# Patient Record
Sex: MALE | Race: WHITE | NOT HISPANIC OR LATINO | Employment: FULL TIME | URBAN - NONMETROPOLITAN AREA
[De-identification: names, ages, dates, MRNs, and addresses within clinical notes are randomized per-mention and may not be internally consistent; named-entity substitution may affect disease eponyms.]

---

## 2017-05-09 ENCOUNTER — TELEPHONE (OUTPATIENT)
Dept: FAMILY MEDICINE CLINIC | Facility: CLINIC | Age: 57
End: 2017-05-09

## 2017-05-09 DIAGNOSIS — Z12.11 SCREEN FOR COLON CANCER: Primary | ICD-10-CM

## 2017-05-17 ENCOUNTER — OFFICE VISIT (OUTPATIENT)
Dept: FAMILY MEDICINE CLINIC | Facility: CLINIC | Age: 57
End: 2017-05-17

## 2017-05-17 VITALS
BODY MASS INDEX: 29.54 KG/M2 | SYSTOLIC BLOOD PRESSURE: 130 MMHG | WEIGHT: 237.6 LBS | HEIGHT: 75 IN | DIASTOLIC BLOOD PRESSURE: 80 MMHG

## 2017-05-17 DIAGNOSIS — I25.10 CORONARY ARTERY DISEASE INVOLVING NATIVE CORONARY ARTERY OF NATIVE HEART WITHOUT ANGINA PECTORIS: Chronic | ICD-10-CM

## 2017-05-17 DIAGNOSIS — I10 ESSENTIAL HYPERTENSION: Chronic | ICD-10-CM

## 2017-05-17 DIAGNOSIS — G89.29 CHRONIC NECK PAIN: ICD-10-CM

## 2017-05-17 DIAGNOSIS — M54.2 CHRONIC NECK PAIN: ICD-10-CM

## 2017-05-17 DIAGNOSIS — F41.9 ANXIETY: Primary | Chronic | ICD-10-CM

## 2017-05-17 PROCEDURE — 99214 OFFICE O/P EST MOD 30 MIN: CPT | Performed by: FAMILY MEDICINE

## 2017-05-17 RX ORDER — TRAMADOL HYDROCHLORIDE 50 MG/1
50 TABLET ORAL EVERY 6 HOURS PRN
Qty: 20 TABLET | Refills: 2 | Status: SHIPPED | OUTPATIENT
Start: 2017-05-17 | End: 2017-11-15 | Stop reason: SDUPTHER

## 2017-05-18 ENCOUNTER — ANESTHESIA (OUTPATIENT)
Dept: GASTROENTEROLOGY | Facility: HOSPITAL | Age: 57
End: 2017-05-18

## 2017-05-18 ENCOUNTER — HOSPITAL ENCOUNTER (OUTPATIENT)
Facility: HOSPITAL | Age: 57
Setting detail: HOSPITAL OUTPATIENT SURGERY
Discharge: HOME | End: 2017-05-18
Attending: INTERNAL MEDICINE | Admitting: INTERNAL MEDICINE

## 2017-05-18 ENCOUNTER — ANESTHESIA EVENT (OUTPATIENT)
Dept: GASTROENTEROLOGY | Facility: HOSPITAL | Age: 57
End: 2017-05-18

## 2017-05-18 VITALS
SYSTOLIC BLOOD PRESSURE: 110 MMHG | OXYGEN SATURATION: 98 % | HEIGHT: 75 IN | TEMPERATURE: 97.5 F | WEIGHT: 232.5 LBS | HEART RATE: 56 BPM | BODY MASS INDEX: 28.91 KG/M2 | RESPIRATION RATE: 16 BRPM | DIASTOLIC BLOOD PRESSURE: 63 MMHG

## 2017-05-18 DIAGNOSIS — Z12.11 COLON CANCER SCREENING: ICD-10-CM

## 2017-05-18 PROCEDURE — 88305 TISSUE EXAM BY PATHOLOGIST: CPT | Performed by: PATHOLOGY

## 2017-05-18 PROCEDURE — 25010000002 MIDAZOLAM PER 1 MG: Performed by: NURSE ANESTHETIST, CERTIFIED REGISTERED

## 2017-05-18 PROCEDURE — 25010000002 PROPOFOL 10 MG/ML EMULSION: Performed by: NURSE ANESTHETIST, CERTIFIED REGISTERED

## 2017-05-18 PROCEDURE — 25010000002 FENTANYL CITRATE (PF) 100 MCG/2ML SOLUTION: Performed by: NURSE ANESTHETIST, CERTIFIED REGISTERED

## 2017-05-18 PROCEDURE — 88305 TISSUE EXAM BY PATHOLOGIST: CPT | Performed by: INTERNAL MEDICINE

## 2017-05-18 RX ORDER — DEXTROSE AND SODIUM CHLORIDE 5; .45 G/100ML; G/100ML
20 INJECTION, SOLUTION INTRAVENOUS CONTINUOUS
Status: DISCONTINUED | OUTPATIENT
Start: 2017-05-18 | End: 2017-05-18 | Stop reason: HOSPADM

## 2017-05-18 RX ORDER — MIDAZOLAM HYDROCHLORIDE 1 MG/ML
INJECTION INTRAMUSCULAR; INTRAVENOUS AS NEEDED
Status: DISCONTINUED | OUTPATIENT
Start: 2017-05-18 | End: 2017-05-18 | Stop reason: SURG

## 2017-05-18 RX ORDER — PROPOFOL 10 MG/ML
VIAL (ML) INTRAVENOUS AS NEEDED
Status: DISCONTINUED | OUTPATIENT
Start: 2017-05-18 | End: 2017-05-18 | Stop reason: SURG

## 2017-05-18 RX ORDER — FENTANYL CITRATE 50 UG/ML
INJECTION, SOLUTION INTRAMUSCULAR; INTRAVENOUS AS NEEDED
Status: DISCONTINUED | OUTPATIENT
Start: 2017-05-18 | End: 2017-05-18 | Stop reason: SURG

## 2017-05-18 RX ADMIN — PROPOFOL 100 MG: 10 INJECTION, EMULSION INTRAVENOUS at 09:15

## 2017-05-18 RX ADMIN — FENTANYL CITRATE 50 MCG: 50 INJECTION, SOLUTION INTRAMUSCULAR; INTRAVENOUS at 09:10

## 2017-05-18 RX ADMIN — DEXTROSE AND SODIUM CHLORIDE 20 ML/HR: 5; 450 INJECTION, SOLUTION INTRAVENOUS at 08:39

## 2017-05-18 RX ADMIN — FENTANYL CITRATE 50 MCG: 50 INJECTION, SOLUTION INTRAMUSCULAR; INTRAVENOUS at 09:12

## 2017-05-18 RX ADMIN — PROPOFOL 40 MG: 10 INJECTION, EMULSION INTRAVENOUS at 09:20

## 2017-05-18 RX ADMIN — MIDAZOLAM 2 MG: 1 INJECTION INTRAMUSCULAR; INTRAVENOUS at 09:08

## 2017-05-19 LAB
LAB AP CASE REPORT: NORMAL
Lab: NORMAL
PATH REPORT.FINAL DX SPEC: NORMAL
PATH REPORT.GROSS SPEC: NORMAL

## 2017-06-07 ENCOUNTER — OFFICE VISIT (OUTPATIENT)
Dept: CARDIOLOGY | Facility: CLINIC | Age: 57
End: 2017-06-07

## 2017-06-07 VITALS
SYSTOLIC BLOOD PRESSURE: 136 MMHG | DIASTOLIC BLOOD PRESSURE: 84 MMHG | WEIGHT: 233 LBS | BODY MASS INDEX: 28.97 KG/M2 | HEIGHT: 75 IN | HEART RATE: 64 BPM

## 2017-06-07 DIAGNOSIS — M54.2 CHRONIC NECK PAIN: ICD-10-CM

## 2017-06-07 DIAGNOSIS — I10 ESSENTIAL HYPERTENSION: Chronic | ICD-10-CM

## 2017-06-07 DIAGNOSIS — G89.29 CHRONIC NECK PAIN: ICD-10-CM

## 2017-06-07 DIAGNOSIS — I48.0 PAROXYSMAL ATRIAL FIBRILLATION (HCC): Primary | ICD-10-CM

## 2017-06-07 DIAGNOSIS — E78.5 HYPERLIPIDEMIA, UNSPECIFIED HYPERLIPIDEMIA TYPE: Chronic | ICD-10-CM

## 2017-06-07 DIAGNOSIS — I25.10 CORONARY ARTERY DISEASE INVOLVING NATIVE CORONARY ARTERY OF NATIVE HEART WITHOUT ANGINA PECTORIS: Chronic | ICD-10-CM

## 2017-06-07 PROCEDURE — 99213 OFFICE O/P EST LOW 20 MIN: CPT | Performed by: INTERNAL MEDICINE

## 2017-06-07 RX ORDER — PRASUGREL 10 MG/1
10 TABLET, FILM COATED ORAL DAILY
Qty: 90 TABLET | Refills: 2 | Status: SHIPPED | OUTPATIENT
Start: 2017-06-07 | End: 2018-04-01 | Stop reason: SDUPTHER

## 2017-06-07 RX ORDER — ATORVASTATIN CALCIUM 20 MG/1
20 TABLET, FILM COATED ORAL DAILY
Qty: 90 TABLET | Refills: 2 | Status: SHIPPED | OUTPATIENT
Start: 2017-06-07 | End: 2018-05-15

## 2017-06-07 NOTE — PROGRESS NOTES
Shayla Kebede  56 y.o. male    06/07/2017  1. Paroxysmal atrial fibrillation    2. Coronary artery disease involving native coronary artery of native heart without angina pectoris    3. Hyperlipidemia, unspecified hyperlipidemia type    4. Essential hypertension    5. Chronic neck pain        History of Present Illness: Presented for routine follow-up    56 years old patient with past medical history significant for hyperlipidemia, paroxysmal atrial fibrillation to last echocardiographic study 2008, abnormal stress test leading to cardiac catheterization with PTCA stent of LAD and left circumflex system.  Patient is on appropriate medical management and during remarkably well.  He scheduled to the blood test done that included lipid profile through is a family doctor's office.  He denies orthopnea, PND, nauseous, vomiting.  Denies any polydipsia polyuria.  Denies any fever cough or chills.  Present dysuria hematuria.  Had history of chronic neck pain.            SUBJECTIVE    No Known Allergies      Past Medical History:   Diagnosis Date   • Acquired equinus deformity of foot    • Anxiety    • Anxiety state    • Coronary arteriosclerosis    • Foreign body in conjunctival sac     OD   • Ganglion cyst     Ganglion/synovial cyst - hand      • Hyperlipidemia    • Insomnia    • Knee pain    • Neck pain     right upper extremity radiculopathy   • Pain     plantar heel pain   • Pain In Right Arm    • Plantar fasciitis    • Presbyopia          Past Surgical History:   Procedure Laterality Date   • CARDIAC CATHETERIZATION  03/04/2015    Cardiac cath 62243 (2)    Critical lesion in the circumflex coronary artery, proximal/mid, and successful PCI w/balloon angioplasty.Noncritical disease in the RCA and LAD.Normal LV systolic function with Ef of 55% to 60%.    • COLONOSCOPY N/A 5/18/2017    Procedure: COLONOSCOPY;  Surgeon: Curly Mann DO;  Location: [facility] ENDOSCOPY;  Service:    • INJECTION OF MEDICATION   "10/10/2014    Dexamethasone (1)      • INJECTION OF MEDICATION  10/10/2014    Kenalog (1)    • KNEE ACL RECONSTRUCTION     • KNEE ARTHROSCOPY      Knee arthroscopy, surgery (1)      • OTHER SURGICAL HISTORY  10/10/2014    Inj(s) Tend-Sheath, Ligament, Single 20550 (1)     • TOTAL KNEE ARTHROPLASTY      Total knee replacement (1)    done in the late to mid 90's          Family History   Problem Relation Age of Onset   • Aneurysm Mother    • Heart attack Father          Social History     Social History   • Marital status:      Spouse name: N/A   • Number of children: N/A   • Years of education: N/A     Occupational History   • Not on file.     Social History Main Topics   • Smoking status: Former Smoker   • Smokeless tobacco: Never Used   • Alcohol use Yes      Comment: social   • Drug use: No   • Sexual activity: Defer     Other Topics Concern   • Not on file     Social History Narrative         Current Outpatient Prescriptions   Medication Sig Dispense Refill   • aspirin 81 MG chewable tablet Chew 81 mg Daily.     • atorvastatin (LIPITOR) 20 MG tablet TAKE ONE (1) TABLET BY MOUTH EVERY DAY AT BEDTIME 30 tablet 5   • clonazePAM (KlonoPIN) 0.5 MG tablet Take 0.5 mg by mouth As Needed.     • EFFIENT 10 MG tablet TAKE ONE (1) TABLET BY MOUTH EVERY DAY 30 tablet 12   • sildenafil (VIAGRA) 100 MG tablet Take 1 tablet by mouth As Needed for erectile dysfunction. 0.5 to 1 as needed 1 hour before intercoarse 10 tablet 11   • traMADol (ULTRAM) 50 MG tablet Take 1 tablet by mouth Every 6 (Six) Hours As Needed for Moderate Pain (4-6). 20 tablet 2     No current facility-administered medications for this visit.          OBJECTIVE    /84  Pulse 64  Ht 75\" (190.5 cm)  Wt 233 lb (106 kg)  BMI 29.12 kg/m2        Review of Systems     Constitutional:  Denies recent weight loss, weight gain, fever or chills, no change in exercise tolerance     HENT:  Denies any hearing loss, epistaxis, hoarseness, or difficulty " speaking.     Eyes: Wears eyeglasses or contact lenses     Respiratory:  Denies dyspnea with exertion,no cough, wheezing, or hemoptysis.     Cardiovascular: See H&P    Gastrointestinal:  Denies change in bowel habits, dyspepsia, ulcer disease, hematochezia, or melena.     Endocrine: Negative for cold intolerance, heat intolerance, polydipsia, polyphagia and polyuria. Denies any history of weight change, heat/cold intolerance, polydipsia, polyuria     Genitourinary: Negative.      Musculoskeletal:  history of arthritic symptoms or back problems     Skin:  Denies any change in hair or nails, rashes, or skin lesions.     Allergic/Immunologic: Negative.  Negative for environmental allergies, food allergies and immunocompromised state.     Neurological:  Denies any history of recurrent headaches, strokes, TIA, or seizure disorder.     Hematological: Denies any food allergies, seasonal allergies, bleeding disorders, or lymphadenopathy.     Psychiatric/Behavioral: Denies any history of depression, substance abuse, or change in cognitive function.         Physical Exam     Constitutional: Cooperative, alert and oriented, well-developed, well-nourished, in no acute distress.     HENT:   Head: Normocephalic, normal hair patterns, no masses or tenderness.  Ears, Nose, and Throat: No gross abnormalities. No pallor or cyanosis. Dentition good.   Eyes: EOMS intact, PERRL, conjunctivae and lids unremarkable. Fundoscopic exam and visual fields not performed.   Neck: No palpable masses or adenopathy, no thyromegaly, no JVD, carotid pulses are full and equal bilaterally and without  Bruits.     Cardiovascular: Regular rhythm, S1 and S2 normal, no S3 or S4. Apical impulse not displaced. No murmurs, gallops, or rubs detected.     Pulmonary/Chest: Chest: normal symmetry, no tenderness to palpation, normal respiratory excursion, no intercostal retraction, no use of accessory muscles.            Pulmonary: Normal breath sounds. No rales  or ronchi.    Abdominal: Abdomen soft, bowel sounds normoactive, no masses, no hepatosplenomegaly, non-tender, no bruits.     Musculoskeletal: No deformities, clubbing, cyanosis, erythema, or edema observed. There are no spinal abnormalities noted. Normal muscle strength and tone. Pulses full and equal in all extremities, no bruits auscultated.     Neurological: No gross motor or sensory deficits noted, affect appropriate, oriented to time, person, place.     Skin: Warm and dry to the touch, no apparent skin lesions or masses noted.     Psychiatric: He has a normal mood and affect. His behavior is normal. Judgment and thought content normal.         Procedures      Lab Results   Component Value Date    WBC 7.5 07/10/2015    HGB 15.1 07/10/2015    HCT 45.1 07/10/2015    MCV 88.4 07/10/2015     07/10/2015     Lab Results   Component Value Date    GLUCOSE 96 11/08/2016    BUN 14 11/08/2016    CREATININE 1.0 11/08/2016    CO2 30 11/08/2016    CALCIUM 9.1 11/08/2016    ALBUMIN 4.3 11/08/2016    AST 95 (H) 11/08/2016    ALT 45 11/08/2016     No results found for: CHOL  Lab Results   Component Value Date    TRIG 150 07/10/2015    TRIG 118 04/13/2015    TRIG 262 (H) 05/05/2014     Lab Results   Component Value Date    HDL 53 (L) 07/10/2015    HDL 53 (L) 04/13/2015     Lab Results   Component Value Date    LDLCALC 109 07/10/2015    LDLCALC 131 (H) 04/13/2015     No results found for: LDL  No results found for: HDLLDLRATIO  No components found for: CHOLHDL  No results found for: HGBA1C  Lab Results   Component Value Date    TSH 2.51 07/10/2015           ASSESSMENT AND PLAN  #1 CAD status post PTCA/ stent.  #2 paroxysmal atrial fibrillation no further recurrence.  #3 hyperlipidemia    56 years old patient with history of CAD status post PTCA stent of LAD and left circumflex system doing remarkably well.  Last echocardiographic study in 2008.  Will arrange another echocardiographic study given the history of paroxysmal  atrial fibrillation and CAD.  He will continue atorvastatin for management of hyperlipidemia with attending lipid profile, aspirin and Effient.  History of CAD and PTCA stent of LAD and left circumflex system.  We will see him back in 6 month R depends on patient clinical conditions.    Diagnoses and all orders for this visit:    Paroxysmal atrial fibrillation  -     Adult Transthoracic Echo Complete    Coronary artery disease involving native coronary artery of native heart without angina pectoris    Hyperlipidemia, unspecified hyperlipidemia type    Essential hypertension    Chronic neck pain        Amber Dee MD  6/7/2017  3:25 PM

## 2017-10-31 ENCOUNTER — LAB (OUTPATIENT)
Dept: LAB | Facility: HOSPITAL | Age: 57
End: 2017-10-31

## 2017-10-31 DIAGNOSIS — I25.10 CORONARY ARTERY DISEASE INVOLVING NATIVE CORONARY ARTERY OF NATIVE HEART WITHOUT ANGINA PECTORIS: Chronic | ICD-10-CM

## 2017-10-31 LAB
ALBUMIN SERPL-MCNC: 4.3 G/DL (ref 3.4–4.8)
ALBUMIN/GLOB SERPL: 1.5 G/DL (ref 1.1–1.8)
ALP SERPL-CCNC: 56 U/L (ref 38–126)
ALT SERPL W P-5'-P-CCNC: 40 U/L (ref 21–72)
ANION GAP SERPL CALCULATED.3IONS-SCNC: 14 MMOL/L (ref 5–15)
ARTICHOKE IGE QN: 95 MG/DL (ref 1–129)
AST SERPL-CCNC: 29 U/L (ref 17–59)
BILIRUB SERPL-MCNC: 0.5 MG/DL (ref 0.2–1.3)
BUN BLD-MCNC: 12 MG/DL (ref 7–21)
BUN/CREAT SERPL: 11 (ref 7–25)
CALCIUM SPEC-SCNC: 9.1 MG/DL (ref 8.4–10.2)
CHLORIDE SERPL-SCNC: 100 MMOL/L (ref 95–110)
CO2 SERPL-SCNC: 26 MMOL/L (ref 22–31)
CREAT BLD-MCNC: 1.09 MG/DL (ref 0.7–1.3)
GFR SERPL CREATININE-BSD FRML MDRD: 69 ML/MIN/1.73 (ref 56–130)
GLOBULIN UR ELPH-MCNC: 2.8 GM/DL (ref 2.3–3.5)
GLUCOSE BLD-MCNC: 126 MG/DL (ref 60–100)
MAGNESIUM SERPL-MCNC: 2.1 MG/DL (ref 1.6–2.3)
POTASSIUM BLD-SCNC: 3.7 MMOL/L (ref 3.5–5.1)
PROT SERPL-MCNC: 7.1 G/DL (ref 6.3–8.6)
SODIUM BLD-SCNC: 140 MMOL/L (ref 137–145)

## 2017-10-31 PROCEDURE — 83735 ASSAY OF MAGNESIUM: CPT | Performed by: FAMILY MEDICINE

## 2017-10-31 PROCEDURE — 80053 COMPREHEN METABOLIC PANEL: CPT | Performed by: FAMILY MEDICINE

## 2017-10-31 PROCEDURE — 36415 COLL VENOUS BLD VENIPUNCTURE: CPT

## 2017-10-31 PROCEDURE — 83721 ASSAY OF BLOOD LIPOPROTEIN: CPT | Performed by: FAMILY MEDICINE

## 2017-11-01 ENCOUNTER — OFFICE VISIT (OUTPATIENT)
Dept: FAMILY MEDICINE CLINIC | Facility: CLINIC | Age: 57
End: 2017-11-01

## 2017-11-01 VITALS
WEIGHT: 227.7 LBS | DIASTOLIC BLOOD PRESSURE: 80 MMHG | HEIGHT: 75 IN | SYSTOLIC BLOOD PRESSURE: 126 MMHG | BODY MASS INDEX: 28.31 KG/M2

## 2017-11-01 DIAGNOSIS — I10 ESSENTIAL HYPERTENSION: Primary | Chronic | ICD-10-CM

## 2017-11-01 DIAGNOSIS — M54.50 ACUTE LEFT-SIDED LOW BACK PAIN WITHOUT SCIATICA: ICD-10-CM

## 2017-11-01 DIAGNOSIS — E78.5 HYPERLIPIDEMIA, UNSPECIFIED HYPERLIPIDEMIA TYPE: Chronic | ICD-10-CM

## 2017-11-01 DIAGNOSIS — I25.10 CORONARY ARTERY DISEASE INVOLVING NATIVE CORONARY ARTERY OF NATIVE HEART WITHOUT ANGINA PECTORIS: Chronic | ICD-10-CM

## 2017-11-01 PROCEDURE — 99214 OFFICE O/P EST MOD 30 MIN: CPT | Performed by: FAMILY MEDICINE

## 2017-11-01 RX ORDER — CHLORZOXAZONE 500 MG/1
500 TABLET ORAL 4 TIMES DAILY PRN
Qty: 30 TABLET | Refills: 1 | Status: SHIPPED | OUTPATIENT
Start: 2017-11-01 | End: 2017-11-02

## 2017-11-01 NOTE — PROGRESS NOTES
Subjective   Shayla Kebede is a 57 y.o. male.     History of Present Illness   follow-up coronary disease hypertension mild obesity.  In interim is tweaked his back from rotational injury of counseled treatment of same.  Has lost some weight exercising more lab work is excellent.  Declines flu vaccine.  Has had a colonoscopy.  Overall stable.    The following portions of the patient's history were reviewed and updated as appropriate: allergies, current medications, past family history, past medical history, past social history, past surgical history and problem list.    Review of Systems   Constitutional: Negative for activity change, appetite change, fatigue and unexpected weight change.   HENT: Negative for trouble swallowing and voice change.    Eyes: Negative for redness and visual disturbance.   Respiratory: Negative for cough and wheezing.    Cardiovascular: Negative for chest pain and palpitations.   Gastrointestinal: Negative for abdominal pain, constipation, diarrhea, nausea and vomiting.   Genitourinary: Negative for urgency.   Musculoskeletal: Positive for back pain. Negative for joint swelling.   Neurological: Negative for syncope and headaches.   Hematological: Negative for adenopathy.   Psychiatric/Behavioral: Negative for sleep disturbance.       Objective   Physical Exam   Constitutional: He is oriented to person, place, and time. He appears well-developed.   HENT:   Head: Normocephalic.   Right Ear: External ear normal.   Nose: Nose normal.   Mouth/Throat: Oropharynx is clear and moist.   Eyes: Pupils are equal, round, and reactive to light.   Neck: Normal range of motion. No thyromegaly present.   Cardiovascular: Normal rate, regular rhythm, normal heart sounds and intact distal pulses.  Exam reveals no gallop and no friction rub.    No murmur heard.  Pulmonary/Chest: Breath sounds normal.   Abdominal: Soft. He exhibits no distension and no mass. There is no tenderness.   Musculoskeletal:  Normal range of motion. He exhibits tenderness (Point tender right low back pain to full range of motion negative straight leg raise).   Neurological: He is alert and oriented to person, place, and time. He has normal reflexes.   Skin: Skin is warm and dry.   Psychiatric: He has a normal mood and affect. His behavior is normal. Judgment and thought content normal.       Assessment/Plan   Problems Addressed this Visit        Cardiovascular and Mediastinum    Essential hypertension - Primary (Chronic)    Hyperlipidemia (Chronic)    Coronary artery disease involving native coronary artery of native heart without angina pectoris (Chronic)      Other Visit Diagnoses     Acute left-sided low back pain without sciatica             lifestyle measures diet exercise continue weight loss back care etc.  Recheck 6 months.

## 2017-11-02 RX ORDER — TIZANIDINE 2 MG/1
TABLET ORAL
Qty: 20 TABLET | Refills: 1 | Status: SHIPPED | OUTPATIENT
Start: 2017-11-02 | End: 2018-05-15

## 2017-11-03 ENCOUNTER — TELEPHONE (OUTPATIENT)
Dept: FAMILY MEDICINE CLINIC | Facility: CLINIC | Age: 57
End: 2017-11-03

## 2017-11-08 ENCOUNTER — TELEPHONE (OUTPATIENT)
Dept: FAMILY MEDICINE CLINIC | Facility: CLINIC | Age: 57
End: 2017-11-08

## 2017-11-13 ENCOUNTER — TELEPHONE (OUTPATIENT)
Dept: FAMILY MEDICINE CLINIC | Facility: CLINIC | Age: 57
End: 2017-11-13

## 2017-11-13 DIAGNOSIS — M54.42 ACUTE LEFT-SIDED LOW BACK PAIN WITH LEFT-SIDED SCIATICA: Primary | ICD-10-CM

## 2017-11-15 ENCOUNTER — TELEPHONE (OUTPATIENT)
Dept: FAMILY MEDICINE CLINIC | Facility: CLINIC | Age: 57
End: 2017-11-15

## 2017-11-15 DIAGNOSIS — G89.29 CHRONIC NECK PAIN: ICD-10-CM

## 2017-11-15 DIAGNOSIS — M54.2 CHRONIC NECK PAIN: ICD-10-CM

## 2017-11-15 RX ORDER — TRAMADOL HYDROCHLORIDE 50 MG/1
50 TABLET ORAL EVERY 6 HOURS PRN
Qty: 20 TABLET | Refills: 2 | Status: SHIPPED | OUTPATIENT
Start: 2017-11-15 | End: 2018-05-15

## 2017-11-17 DIAGNOSIS — M54.5 LOW BACK PAIN, UNSPECIFIED BACK PAIN LATERALITY, UNSPECIFIED CHRONICITY, WITH SCIATICA PRESENCE UNSPECIFIED: Primary | ICD-10-CM

## 2017-12-05 ENCOUNTER — OFFICE VISIT (OUTPATIENT)
Dept: CARDIOLOGY | Facility: CLINIC | Age: 57
End: 2017-12-05

## 2017-12-05 VITALS
HEIGHT: 75 IN | WEIGHT: 223 LBS | DIASTOLIC BLOOD PRESSURE: 82 MMHG | BODY MASS INDEX: 27.73 KG/M2 | HEART RATE: 62 BPM | SYSTOLIC BLOOD PRESSURE: 130 MMHG

## 2017-12-05 DIAGNOSIS — I25.10 CORONARY ARTERY DISEASE INVOLVING NATIVE CORONARY ARTERY OF NATIVE HEART WITHOUT ANGINA PECTORIS: Chronic | ICD-10-CM

## 2017-12-05 DIAGNOSIS — I48.0 PAROXYSMAL ATRIAL FIBRILLATION (HCC): ICD-10-CM

## 2017-12-05 DIAGNOSIS — I10 ESSENTIAL HYPERTENSION: Primary | Chronic | ICD-10-CM

## 2017-12-05 DIAGNOSIS — E78.5 HYPERLIPIDEMIA, UNSPECIFIED HYPERLIPIDEMIA TYPE: Chronic | ICD-10-CM

## 2017-12-05 LAB
BH CV ECHO MEAS - AO MAX PG (FULL): 2.3 MMHG
BH CV ECHO MEAS - AO MAX PG: 7 MMHG
BH CV ECHO MEAS - AO MEAN PG (FULL): 1 MMHG
BH CV ECHO MEAS - AO MEAN PG: 4 MMHG
BH CV ECHO MEAS - AO V2 MAX: 132 CM/SEC
BH CV ECHO MEAS - AO V2 MEAN: 93.7 CM/SEC
BH CV ECHO MEAS - AO V2 VTI: 30.4 CM
BH CV ECHO MEAS - AVA(I,A): 3.4 CM^2
BH CV ECHO MEAS - AVA(I,D): 3.4 CM^2
BH CV ECHO MEAS - AVA(V,A): 3.4 CM^2
BH CV ECHO MEAS - AVA(V,D): 3.4 CM^2
BH CV ECHO MEAS - BSA(HAYCOCK): 2.4 M^2
BH CV ECHO MEAS - BSA: 2.3 M^2
BH CV ECHO MEAS - BZI_BMI: 28.4 KILOGRAMS/M^2
BH CV ECHO MEAS - BZI_METRIC_HEIGHT: 190.5 CM
BH CV ECHO MEAS - BZI_METRIC_WEIGHT: 103 KG
BH CV ECHO MEAS - EDV(CUBED): 175.6 ML
BH CV ECHO MEAS - EDV(TEICH): 153.7 ML
BH CV ECHO MEAS - EF(CUBED): 63.6 %
BH CV ECHO MEAS - EF(MOD-SP4): 60 %
BH CV ECHO MEAS - EF(TEICH): 54.4 %
BH CV ECHO MEAS - EPSS: 0.5 CM
BH CV ECHO MEAS - ESV(CUBED): 64 ML
BH CV ECHO MEAS - ESV(TEICH): 70 ML
BH CV ECHO MEAS - FS: 28.6 %
BH CV ECHO MEAS - IVS/LVPW: 0.87
BH CV ECHO MEAS - IVSD: 1.3 CM
BH CV ECHO MEAS - LA DIMENSION: 3.4 CM
BH CV ECHO MEAS - LV MASS(C)D: 347.6 GRAMS
BH CV ECHO MEAS - LV MASS(C)DI: 150.1 GRAMS/M^2
BH CV ECHO MEAS - LV MAX PG: 4.7 MMHG
BH CV ECHO MEAS - LV MEAN PG: 3 MMHG
BH CV ECHO MEAS - LV V1 MAX: 108 CM/SEC
BH CV ECHO MEAS - LV V1 MEAN: 73.1 CM/SEC
BH CV ECHO MEAS - LV V1 VTI: 24.7 CM
BH CV ECHO MEAS - LVIDD: 5.6 CM
BH CV ECHO MEAS - LVIDS: 4 CM
BH CV ECHO MEAS - LVOT AREA (M): 4.2 CM^2
BH CV ECHO MEAS - LVOT AREA: 4.2 CM^2
BH CV ECHO MEAS - LVOT DIAM: 2.3 CM
BH CV ECHO MEAS - LVPWD: 1.5 CM
BH CV ECHO MEAS - MR MAX PG: 21.7 MMHG
BH CV ECHO MEAS - MR MAX VEL: 233 CM/SEC
BH CV ECHO MEAS - MV A MAX VEL: 71.6 CM/SEC
BH CV ECHO MEAS - MV E MAX VEL: 64.2 CM/SEC
BH CV ECHO MEAS - MV E/A: 0.9
BH CV ECHO MEAS - PA MAX PG: 4.2 MMHG
BH CV ECHO MEAS - PA MEAN PG: 2 MMHG
BH CV ECHO MEAS - PA V2 MAX: 103 CM/SEC
BH CV ECHO MEAS - PA V2 MEAN: 63.9 CM/SEC
BH CV ECHO MEAS - PA V2 VTI: 19.3 CM
BH CV ECHO MEAS - RAP SYSTOLE: 10 MMHG
BH CV ECHO MEAS - RVDD: 2.1 CM
BH CV ECHO MEAS - RVSP: 25.7 MMHG
BH CV ECHO MEAS - SI(CUBED): 48.2 ML/M^2
BH CV ECHO MEAS - SI(LVOT): 44.3 ML/M^2
BH CV ECHO MEAS - SI(TEICH): 36.1 ML/M^2
BH CV ECHO MEAS - SV(CUBED): 111.6 ML
BH CV ECHO MEAS - SV(LVOT): 102.6 ML
BH CV ECHO MEAS - SV(TEICH): 83.7 ML
BH CV ECHO MEAS - TR MAX VEL: 197.5 CM/SEC
LV EF 2D ECHO EST: 55 %

## 2017-12-05 PROCEDURE — 93000 ELECTROCARDIOGRAM COMPLETE: CPT | Performed by: INTERNAL MEDICINE

## 2017-12-05 PROCEDURE — 99213 OFFICE O/P EST LOW 20 MIN: CPT | Performed by: INTERNAL MEDICINE

## 2017-12-05 NOTE — PROGRESS NOTES
Shayla Kebede  57 y.o. male    12/05/2017  1. Essential hypertension    2. Hyperlipidemia, unspecified hyperlipidemia type    3. Coronary artery disease involving native coronary artery of native heart without angina pectoris    4. Paroxysmal atrial fibrillation        History of Present Illness:Routine follow-up      57 years old patient with past medical history significant for hyperlipidemia, paroxysmal atrial fibrillation to last echocardiographic study 2008, abnormal stress test leading to cardiac catheterization with PTCA stent of LAD and left circumflex system.  Patient is on appropriate medical management and during remarkably well.  He scheduled to the blood test done that included lipid profile through is a family doctor's office.  He denies orthopnea, PND, nauseous, vomiting.  Denies any polydipsia polyuria.  Denies any fever cough or chills.  Present dysuria hematuria.  Had history of chronic neck pain  · Left ventricular systolic function is normal. Estimated EF = 55%.  · Left ventricular diastolic dysfunction (grade I) consistent with impaired relaxation.  · Mild tricuspid valve regurgitation is present    SUBJECTIVE    No Known Allergies      Past Medical History:   Diagnosis Date   • Acquired equinus deformity of foot    • Anxiety    • Anxiety state    • Coronary arteriosclerosis    • Foreign body in conjunctival sac     OD   • Ganglion cyst     Ganglion/synovial cyst - hand      • Hyperlipidemia    • Insomnia    • Knee pain    • Neck pain     right upper extremity radiculopathy   • Pain     plantar heel pain   • Pain in right arm    • Plantar fasciitis    • Presbyopia          Past Surgical History:   Procedure Laterality Date   • CARDIAC CATHETERIZATION  03/04/2015    Cardiac cath 61435 (2)    Critical lesion in the circumflex coronary artery, proximal/mid, and successful PCI w/balloon angioplasty.Noncritical disease in the RCA and LAD.Normal LV systolic function with Ef of 55% to 60%.    •  "COLONOSCOPY N/A 5/18/2017    Procedure: COLONOSCOPY;  Surgeon: Curly Mann DO;  Location: [facility] ENDOSCOPY;  Service:    • INJECTION OF MEDICATION  10/10/2014    Dexamethasone (1)      • INJECTION OF MEDICATION  10/10/2014    Kenalog (1)    • KNEE ACL RECONSTRUCTION     • KNEE ARTHROSCOPY      Knee arthroscopy, surgery (1)      • OTHER SURGICAL HISTORY  10/10/2014    Inj(s) Tend-Sheath, Ligament, Single 98993 (1)     • TOTAL KNEE ARTHROPLASTY      Total knee replacement (1)    done in the late to mid 90's          Family History   Problem Relation Age of Onset   • Aneurysm Mother    • Heart attack Father          Social History     Social History   • Marital status:      Spouse name: N/A   • Number of children: N/A   • Years of education: N/A     Occupational History   • Not on file.     Social History Main Topics   • Smoking status: Former Smoker   • Smokeless tobacco: Never Used   • Alcohol use Yes      Comment: social   • Drug use: No   • Sexual activity: Defer     Other Topics Concern   • Not on file     Social History Narrative         Current Outpatient Prescriptions   Medication Sig Dispense Refill   • aspirin 81 MG chewable tablet Chew 81 mg Daily.     • atorvastatin (LIPITOR) 20 MG tablet Take 1 tablet by mouth Daily. 90 tablet 2   • clonazePAM (KlonoPIN) 0.5 MG tablet Take 0.5 mg by mouth As Needed.     • prasugrel (EFFIENT) 10 MG tablet Take 1 tablet by mouth Daily. 90 tablet 2   • sildenafil (VIAGRA) 100 MG tablet Take 1 tablet by mouth As Needed for erectile dysfunction. 0.5 to 1 as needed 1 hour before intercoarse 10 tablet 11   • tiZANidine (ZANAFLEX) 2 MG tablet 1 bid prn muscle spasms 20 tablet 1   • traMADol (ULTRAM) 50 MG tablet Take 1 tablet by mouth Every 6 (Six) Hours As Needed for Moderate Pain . 20 tablet 2     No current facility-administered medications for this visit.          OBJECTIVE    /82  Pulse 62  Ht 190.5 cm (75\")  Wt 101 kg (223 lb)  BMI 27.87 " kg/m2        Review of Systems     Constitutional:  Denies recent weight loss, weight gain, fever or chills, no change in exercise tolerance     HENT:  Denies any hearing loss, epistaxis, hoarseness, or difficulty speaking.     Eyes: No blurry Respiratory:  Denies dyspnea with exertion,no cough, wheezing, or hemoptysis.     Cardiovascular: See H&P    Gastrointestinal:  Denies change in bowel habits, dyspepsia, ulcer disease, hematochezia, or melena.     Endocrine: Negative for cold intolerance, heat intolerance, polydipsia, polyphagia and polyuria. Denies any history of weight change, heat/cold intolerance, polydipsia, polyuria     Genitourinary: Negative.      Musculoskeletal: Denies any history of arthritic symptoms or back problems     Skin:  Denies any change in hair or nails, rashes, or skin lesions.     Allergic/Immunologic: Negative.  Negative for environmental allergies, food allergies and immunocompromised state.     Neurological:  Denies any history of recurrent headaches, strokes, TIA, or seizure disorder.     Hematological: Denies any food allergies, seasonal allergies, bleeding disorders, or lymphadenopathy.     Psychiatric/Behavioral: Denies any history of depression, substance abuse, or change in cognitive function.         Physical Exam     Constitutional: Cooperative, alert and oriented, well-developed, well-nourished, in no acute distress.     HENT:   Head: Normocephalic, normal hair patterns, no masses or tenderness.  Ears, Nose, and Throat: No gross abnormalities. No pallor or cyanosis. Dentition good.   Eyes: EOMS intact, PERRL, conjunctivae and lids unremarkable. Fundoscopic exam and visual fields not performed.   Neck: No palpable masses or adenopathy, no thyromegaly, no JVD, carotid pulses are full and equal bilaterally and without  Bruits.     Cardiovascular: Regular rhythm, S1 and S2 normal, no S3 or S4. Apical impulse not displaced. No murmurs, gallops, or rubs detected.      Pulmonary/Chest: Chest: normal symmetry, no tenderness to palpation, normal respiratory excursion, no intercostal retraction, no use of accessory muscles.            Pulmonary: Normal breath sounds. No rales or ronchi.    Abdominal: Abdomen soft, bowel sounds normoactive, no masses, no hepatosplenomegaly, non-tender, no bruits.     Musculoskeletal: No deformities, clubbing, cyanosis, erythema, or edema observed. There are no spinal abnormalities noted. Normal muscle strength and tone. Pulses full and equal in all extremities, no bruits auscultated.     Neurological: No gross motor or sensory deficits noted, affect appropriate, oriented to time, person, place.     Skin: Warm and dry to the touch, no apparent skin lesions or masses noted.     Psychiatric: He has a normal mood and affect. His behavior is normal. Judgment and thought content normal.           ECG 12 Lead  Date/Time: 12/5/2017 4:23 PM  Performed by: GENE OLIVARES  Authorized by: GENE OLIVARES   Comparison: not compared with previous ECG   Rhythm: sinus rhythm              Lab Results   Component Value Date    WBC 7.5 07/10/2015    HGB 15.1 07/10/2015    HCT 45.1 07/10/2015    MCV 88.4 07/10/2015     07/10/2015     Lab Results   Component Value Date    GLUCOSE 126 (H) 10/31/2017    BUN 12 10/31/2017    CREATININE 1.09 10/31/2017    EGFRIFNONA 69 10/31/2017    BCR 11.0 10/31/2017    CO2 26.0 10/31/2017    CALCIUM 9.1 10/31/2017    ALBUMIN 4.30 10/31/2017    LABIL2 1.5 10/31/2017    AST 29 10/31/2017    ALT 40 10/31/2017     No results found for: CHOL  Lab Results   Component Value Date    TRIG 150 07/10/2015    TRIG 118 04/13/2015    TRIG 262 (H) 05/05/2014     Lab Results   Component Value Date    HDL 53 (L) 07/10/2015    HDL 53 (L) 04/13/2015     Lab Results   Component Value Date    LDLCALC 109 07/10/2015    LDLCALC 131 (H) 04/13/2015     No results found for: LDL  No results found for: HDLLDLRATIO  No components found for: CHOLHDL  No  results found for: HGBA1C  Lab Results   Component Value Date    TSH 2.51 07/10/2015           ASSESSMENT AND PLAN  #1 CAD status post PTCA/ stent.  #2 paroxysmal atrial fibrillation no further recurrence.  #3 hyperlipidemia with recent  LDL Cholesterol  1 - 129 mg/dL 95          57 years old patient with history of CAD status post PTCA stent of LAD and left circumflex system doing remarkably well.  Last echocardiographic study in 2008.  Will arrange another echocardiographic study given the history of paroxysmal atrial fibrillation and CAD.  He will continue atorvastatin for management of hyperlipidemia with attending lipid profile, aspirin and Effient.  History of CAD and PTCA stent of LAD and left circumflex system.Recent to lipid profile then done to is a family doctor's office.  LDL within the normal range.  Echocardiographic study finding discussed with the patient  We will see him back in 6 month R depends on patient clinical conditions    Shayla was seen today for follow-up.    Diagnoses and all orders for this visit:    Essential hypertension    Hyperlipidemia, unspecified hyperlipidemia type    Coronary artery disease involving native coronary artery of native heart without angina pectoris    Paroxysmal atrial fibrillation        Amber Dee MD  12/5/2017  2:59 PM

## 2018-02-15 ENCOUNTER — OFFICE VISIT (OUTPATIENT)
Dept: FAMILY MEDICINE CLINIC | Facility: CLINIC | Age: 58
End: 2018-02-15

## 2018-02-15 VITALS
WEIGHT: 224 LBS | HEIGHT: 75 IN | DIASTOLIC BLOOD PRESSURE: 82 MMHG | BODY MASS INDEX: 27.85 KG/M2 | SYSTOLIC BLOOD PRESSURE: 128 MMHG

## 2018-02-15 DIAGNOSIS — M25.511 CHRONIC RIGHT SHOULDER PAIN: Primary | ICD-10-CM

## 2018-02-15 DIAGNOSIS — M75.41 IMPINGEMENT SYNDROME, SHOULDER, RIGHT: ICD-10-CM

## 2018-02-15 DIAGNOSIS — G89.29 CHRONIC RIGHT SHOULDER PAIN: Primary | ICD-10-CM

## 2018-02-15 PROCEDURE — 99213 OFFICE O/P EST LOW 20 MIN: CPT | Performed by: FAMILY MEDICINE

## 2018-02-15 NOTE — PROGRESS NOTES
Subjective   Shayla Kebede is a 57 y.o. male.     History of Present Illness  quest evaluation impingement syndrome right shoulder that way about a year.  Been doing home exercises.  States is unable to AB duct past about 110.  No history of direct trauma but just prolonged.  His trauma over time.  History noted.    The following portions of the patient's history were reviewed and updated as appropriate: allergies, current medications, past family history, past medical history, past social history, past surgical history and problem list.    Review of Systems   Constitutional: Negative for activity change, appetite change, fatigue and unexpected weight change.   HENT: Negative for trouble swallowing and voice change.    Eyes: Negative for redness and visual disturbance.   Respiratory: Negative for cough and wheezing.    Cardiovascular: Negative for chest pain and palpitations.   Gastrointestinal: Negative for abdominal pain, constipation, diarrhea, nausea and vomiting.   Genitourinary: Negative for urgency.   Musculoskeletal: Positive for arthralgias. Negative for joint swelling.   Neurological: Negative for syncope and headaches.   Hematological: Negative for adenopathy.   Psychiatric/Behavioral: Negative for sleep disturbance.       Objective   Physical Exam   Constitutional: He appears well-developed.   HENT:   Head: Normocephalic.   Eyes: Pupils are equal, round, and reactive to light.   Neck: Normal range of motion.   Cardiovascular: Normal rate.    Pulmonary/Chest: Effort normal.   Abdominal: Soft.   Musculoskeletal:        Right shoulder: He exhibits decreased range of motion and tenderness.   Point tender lateral.  Before meals joint nontender.  Abduction tenderness to 90 decreased active 110 passive to about 120 to 1:30.   Neurological:    normal negative pronator   Psychiatric: He has a normal mood and affect. His speech is normal.       Assessment/Plan   Shayla was seen today for shoulder  pain.    Diagnoses and all orders for this visit:    Chronic right shoulder pain  -     Ambulatory Referral to Orthopedic Surgery    Impingement syndrome, shoulder, right  -     Ambulatory Referral to Orthopedic Surgery      states it works in out of town but not be able to do full physical therapy.   need for possible intervention by orthopedics given the fact of time and fell of exercise.  Made arrangements for same.

## 2018-02-23 DIAGNOSIS — M25.511 RIGHT SHOULDER PAIN, UNSPECIFIED CHRONICITY: Primary | ICD-10-CM

## 2018-03-01 ENCOUNTER — OFFICE VISIT (OUTPATIENT)
Dept: ORTHOPEDIC SURGERY | Facility: CLINIC | Age: 58
End: 2018-03-01

## 2018-03-01 VITALS — WEIGHT: 224.87 LBS | HEIGHT: 75 IN | BODY MASS INDEX: 27.96 KG/M2

## 2018-03-01 DIAGNOSIS — M75.01 ADHESIVE CAPSULITIS OF RIGHT SHOULDER: ICD-10-CM

## 2018-03-01 DIAGNOSIS — M25.511 RIGHT SHOULDER PAIN, UNSPECIFIED CHRONICITY: Primary | ICD-10-CM

## 2018-03-01 PROCEDURE — 20610 DRAIN/INJ JOINT/BURSA W/O US: CPT | Performed by: ORTHOPAEDIC SURGERY

## 2018-03-01 PROCEDURE — 99203 OFFICE O/P NEW LOW 30 MIN: CPT | Performed by: ORTHOPAEDIC SURGERY

## 2018-03-01 RX ORDER — TRIAMCINOLONE ACETONIDE 40 MG/ML
80 INJECTION, SUSPENSION INTRA-ARTICULAR; INTRAMUSCULAR
Status: COMPLETED | OUTPATIENT
Start: 2018-03-01 | End: 2018-03-01

## 2018-03-01 RX ORDER — LIDOCAINE HYDROCHLORIDE 10 MG/ML
4 INJECTION, SOLUTION EPIDURAL; INFILTRATION; INTRACAUDAL; PERINEURAL
Status: COMPLETED | OUTPATIENT
Start: 2018-03-01 | End: 2018-03-01

## 2018-03-01 RX ADMIN — LIDOCAINE HYDROCHLORIDE 4 ML: 10 INJECTION, SOLUTION EPIDURAL; INFILTRATION; INTRACAUDAL; PERINEURAL at 09:21

## 2018-03-01 RX ADMIN — TRIAMCINOLONE ACETONIDE 80 MG: 40 INJECTION, SUSPENSION INTRA-ARTICULAR; INTRAMUSCULAR at 09:21

## 2018-03-01 NOTE — PROGRESS NOTES
Shayla Kebede is a 57 y.o. male returns for     Chief Complaint   Patient presents with   • Right Shoulder - Pain       HISTORY OF PRESENT ILLNESS: Patient is here today for right shoulder pain. Patient was sent to [facility] upon arrival. Patient states that his pain is 1-2 unless he moves his arm quickly or has to do much movement out of the ordinary. When he moves it more doing more motion he states that the pain is severe. This is been going on for several months with no particular injury although he has had a lot of activity doing a lot of painting around his house.  He is right-hand-dominant and has noted radiation without numbness or weakness.  He is unable to take anti-inflammatories because of his cardiac condition.       CONCURRENT MEDICAL HISTORY:    Past Medical History:   Diagnosis Date   • Acquired equinus deformity of foot    • Anxiety    • Anxiety state    • Coronary arteriosclerosis    • Foreign body in conjunctival sac     OD   • Ganglion cyst     Ganglion/synovial cyst - hand      • Hyperlipidemia    • Insomnia    • Knee pain    • Neck pain     right upper extremity radiculopathy   • Pain     plantar heel pain   • Pain in right arm    • Plantar fasciitis    • Presbyopia        No Known Allergies      Current Outpatient Prescriptions:   •  aspirin 81 MG chewable tablet, Chew 81 mg Daily., Disp: , Rfl:   •  atorvastatin (LIPITOR) 20 MG tablet, Take 1 tablet by mouth Daily., Disp: 90 tablet, Rfl: 2  •  clonazePAM (KlonoPIN) 0.5 MG tablet, Take 0.5 mg by mouth As Needed., Disp: , Rfl:   •  prasugrel (EFFIENT) 10 MG tablet, Take 1 tablet by mouth Daily., Disp: 90 tablet, Rfl: 2  •  sildenafil (VIAGRA) 100 MG tablet, Take 1 tablet by mouth As Needed for erectile dysfunction. 0.5 to 1 as needed 1 hour before intercoarse, Disp: 10 tablet, Rfl: 11  •  tiZANidine (ZANAFLEX) 2 MG tablet, 1 bid prn muscle spasms, Disp: 20 tablet, Rfl: 1  •  traMADol (ULTRAM) 50 MG tablet, Take 1 tablet by mouth Every 6  "(Six) Hours As Needed for Moderate Pain ., Disp: 20 tablet, Rfl: 2    Past Surgical History:   Procedure Laterality Date   • CARDIAC CATHETERIZATION  03/04/2015    Cardiac cath 93137 (2)    Critical lesion in the circumflex coronary artery, proximal/mid, and successful PCI w/balloon angioplasty.Noncritical disease in the RCA and LAD.Normal LV systolic function with Ef of 55% to 60%.    • COLONOSCOPY N/A 5/18/2017    Procedure: COLONOSCOPY;  Surgeon: Curly Mann DO;  Location: [facility] ENDOSCOPY;  Service:    • INJECTION OF MEDICATION  10/10/2014    Dexamethasone (1)      • INJECTION OF MEDICATION  10/10/2014    Kenalog (1)    • KNEE ACL RECONSTRUCTION     • KNEE ARTHROSCOPY      Knee arthroscopy, surgery (1)      • OTHER SURGICAL HISTORY  10/10/2014    Inj(s) Tend-Sheath, Ligament, Single 01704 (1)     • TOTAL KNEE ARTHROPLASTY      Total knee replacement (1)    done in the late to mid 90's        ROS  No fevers or chills.  No chest pain or shortness of air.  No GI or  disturbances.Positive for limited motion , no numbness, weakness, left arm pain.        PHYSICAL EXAMINATION:       Ht 190.5 cm (75\")  Wt 102 kg (224 lb 13.9 oz)  BMI 28.11 kg/m2    Physical Exam   Constitutional: He is oriented to person, place, and time. He appears well-developed and well-nourished.   Neurological: He is alert and oriented to person, place, and time.   Skin: Skin is warm and dry.   Psychiatric: He has a normal mood and affect. His behavior is normal.   Vitals reviewed.      GAIT:     []  Normal  []  Antalgic    Assistive device: []  None  []  Walker     []  Crutches  []  Cane     []  Wheelchair  []  Stretcher    Right Shoulder Exam     Tenderness   The patient is experiencing no tenderness.        Range of Motion   Active Abduction: abnormal   Passive Abduction:  60 abnormal   Extension:  20 abnormal   Forward Flexion: 90   External Rotation:  50 abnormal     Muscle Strength   The patient has normal right shoulder " strength.    Tests   Impingement: positive    Other   Erythema: absent  Scars: absent  Sensation: normal  Pulse: present              No results found.    Three-view x-rays of the right shoulder show no obvious bony abnormalities perhaps mild AC joint arthritis glenohumeral joint appears intact    Large Joint Arthrocentesis  Date/Time: 3/1/2018 9:21 AM  Consent given by: patient  Timeout: Immediately prior to procedure a time out was called to verify the correct patient, procedure, equipment, support staff and site/side marked as required   Supporting Documentation  Indications: pain   Procedure Details  Location: shoulder - R subacromial bursa  Preparation: Patient was prepped and draped in the usual sterile fashion  Needle size: 22 G  Approach: posterior  Medications administered: 4 mL lidocaine PF 1% 1 %; 80 mg triamcinolone acetonide 40 MG/ML  Patient tolerance: patient tolerated the procedure well with no immediate complications        ASSESSMENT:    Diagnoses and all orders for this visit:    Right shoulder pain, unspecified chronicity  -     Large Joint Arthrocentesis    Adhesive capsulitis of right shoulder          PLAN after discussing risk and benefits I have injected the right shoulder intra-articularly with 4 into mixture of Xylocaine and Kenalog.  Repeat evaluation showed much less pain on range of motion.  I think this is evidence that both the problem is intra-articular and is in adhesive capsulitis.  He'll do a home program in range of motion exercises which I gone over with him as he works out of town a lot and doesn't have time to go to physical therapy formally.  I will reevaluate him in 3 weeks if not much better we'll consider MRI scan at that point and we'll do this further.  He'll ice the shoulder down tonight.    No Follow-up on file.    Toby Reeder MD

## 2018-04-02 RX ORDER — PRASUGREL 10 MG/1
TABLET, FILM COATED ORAL
Qty: 90 TABLET | Refills: 2 | Status: SHIPPED | OUTPATIENT
Start: 2018-04-02 | End: 2018-12-16 | Stop reason: SDUPTHER

## 2018-05-15 ENCOUNTER — APPOINTMENT (OUTPATIENT)
Dept: LAB | Facility: HOSPITAL | Age: 58
End: 2018-05-15

## 2018-05-15 ENCOUNTER — OFFICE VISIT (OUTPATIENT)
Dept: FAMILY MEDICINE CLINIC | Facility: CLINIC | Age: 58
End: 2018-05-15

## 2018-05-15 VITALS
HEIGHT: 75 IN | DIASTOLIC BLOOD PRESSURE: 80 MMHG | BODY MASS INDEX: 26.64 KG/M2 | WEIGHT: 214.3 LBS | SYSTOLIC BLOOD PRESSURE: 128 MMHG

## 2018-05-15 DIAGNOSIS — I10 ESSENTIAL HYPERTENSION: Chronic | ICD-10-CM

## 2018-05-15 DIAGNOSIS — F41.9 ANXIETY: Chronic | ICD-10-CM

## 2018-05-15 DIAGNOSIS — I25.10 CORONARY ARTERY DISEASE INVOLVING NATIVE CORONARY ARTERY OF NATIVE HEART WITHOUT ANGINA PECTORIS: Primary | Chronic | ICD-10-CM

## 2018-05-15 LAB
CHOLEST SERPL-MCNC: 246 MG/DL (ref 0–199)
HDLC SERPL-MCNC: 52 MG/DL (ref 60–200)
LDLC SERPL CALC-MCNC: 155 MG/DL (ref 0–129)
LDLC/HDLC SERPL: 2.97 {RATIO} (ref 0–3.55)
TRIGL SERPL-MCNC: 197 MG/DL (ref 20–199)
VLDLC SERPL-MCNC: 39.4 MG/DL

## 2018-05-15 PROCEDURE — 80061 LIPID PANEL: CPT | Performed by: FAMILY MEDICINE

## 2018-05-15 PROCEDURE — 36415 COLL VENOUS BLD VENIPUNCTURE: CPT | Performed by: FAMILY MEDICINE

## 2018-05-15 PROCEDURE — 99214 OFFICE O/P EST MOD 30 MIN: CPT | Performed by: FAMILY MEDICINE

## 2018-05-15 RX ORDER — CLONAZEPAM 0.5 MG/1
0.5 TABLET ORAL NIGHTLY PRN
Qty: 30 TABLET | Refills: 3 | Status: SHIPPED | OUTPATIENT
Start: 2018-05-15 | End: 2019-06-17 | Stop reason: SDUPTHER

## 2018-05-15 NOTE — PROGRESS NOTES
Subjective   Shayla Kebede is a 57 y.o. male.     History of Present Illness   follow-up coronary disease neck pain chronic anxiety.  Interim stopped statins due to myalgias says myalgias went away.  We have counseled on the need for following up with his cardiologist deferring for statin and/or other treatment as well as cholesterol from cardiology.  His changes diet and exercising lost weight arthralgias are better overall feels improved.  Is up-to-date on all other.  Does need refill on medicine.    The following portions of the patient's history were reviewed and updated as appropriate: allergies, current medications, past family history, past medical history, past social history, past surgical history and problem list.    Review of Systems   Constitutional: Negative for activity change, appetite change, fatigue and unexpected weight change.   HENT: Negative for trouble swallowing and voice change.    Eyes: Negative for redness and visual disturbance.   Respiratory: Negative for cough and wheezing.    Cardiovascular: Negative for chest pain and palpitations.   Gastrointestinal: Negative for abdominal pain, constipation, diarrhea, nausea and vomiting.   Genitourinary: Positive for difficulty urinating (Chronic nocturia between to 4 times a night  Mouth quite some time.  We have counseled on the possible use medications wishes to hold off at this time). Negative for urgency.   Musculoskeletal: Negative for joint swelling.   Neurological: Negative for syncope and headaches.   Hematological: Negative for adenopathy.   Psychiatric/Behavioral: Negative for sleep disturbance.       Objective   Physical Exam   Constitutional: He is oriented to person, place, and time. He appears well-developed.   HENT:   Head: Normocephalic.   Nose: Nose normal.   Eyes: Pupils are equal, round, and reactive to light.   Neck: Normal range of motion. No thyromegaly present.   Cardiovascular: Normal rate, regular rhythm, normal  heart sounds and intact distal pulses.  Exam reveals no gallop and no friction rub.    No murmur heard.  Pulmonary/Chest: Breath sounds normal.   Abdominal: Soft. He exhibits no distension and no mass. There is no tenderness.   Musculoskeletal: Normal range of motion.   Neurological: He is alert and oriented to person, place, and time. He has normal reflexes.   Skin: Skin is warm and dry.   Psychiatric: He has a normal mood and affect. His behavior is normal. Thought content normal.       Assessment/Plan   Shayla was seen today for hypertension.    Diagnoses and all orders for this visit:    Coronary artery disease involving native coronary artery of native heart without angina pectoris  -     Lipid Panel With LDL / HDL Ratio    Essential hypertension    Anxiety  -     clonazePAM (KlonoPIN) 0.5 MG tablet; Take 1 tablet by mouth At Night As Needed (sleep).       lifestyle measures continue lifestyle measures changes.  Defer to cardiology regards to statin use.  Check 6 months

## 2018-06-25 DIAGNOSIS — F41.9 ANXIETY: Chronic | ICD-10-CM

## 2018-06-25 RX ORDER — CLONAZEPAM 0.5 MG/1
TABLET ORAL
Qty: 180 TABLET | Refills: 0 | OUTPATIENT
Start: 2018-06-25

## 2018-07-24 ENCOUNTER — OFFICE VISIT (OUTPATIENT)
Dept: CARDIOLOGY | Facility: CLINIC | Age: 58
End: 2018-07-24

## 2018-07-24 VITALS
OXYGEN SATURATION: 98 % | WEIGHT: 215.5 LBS | DIASTOLIC BLOOD PRESSURE: 84 MMHG | SYSTOLIC BLOOD PRESSURE: 140 MMHG | HEIGHT: 75 IN | BODY MASS INDEX: 26.79 KG/M2

## 2018-07-24 DIAGNOSIS — E78.5 HYPERLIPIDEMIA, UNSPECIFIED HYPERLIPIDEMIA TYPE: Chronic | ICD-10-CM

## 2018-07-24 DIAGNOSIS — I10 ESSENTIAL HYPERTENSION: Primary | Chronic | ICD-10-CM

## 2018-07-24 DIAGNOSIS — I48.0 PAROXYSMAL ATRIAL FIBRILLATION (HCC): ICD-10-CM

## 2018-07-24 DIAGNOSIS — I25.10 CORONARY ARTERY DISEASE INVOLVING NATIVE CORONARY ARTERY OF NATIVE HEART WITHOUT ANGINA PECTORIS: Chronic | ICD-10-CM

## 2018-07-24 PROCEDURE — 99213 OFFICE O/P EST LOW 20 MIN: CPT | Performed by: INTERNAL MEDICINE

## 2018-07-24 PROCEDURE — 93000 ELECTROCARDIOGRAM COMPLETE: CPT | Performed by: INTERNAL MEDICINE

## 2018-07-24 RX ORDER — ATORVASTATIN CALCIUM 20 MG/1
20 TABLET, FILM COATED ORAL
COMMUNITY
End: 2018-09-24 | Stop reason: SDUPTHER

## 2018-07-24 NOTE — PROGRESS NOTES
Shayla Kebede  58 y.o. male    07/24/2018  1. Essential hypertension    2. Hyperlipidemia, unspecified hyperlipidemia type    3. Coronary artery disease involving native coronary artery of native heart without angina pectoris    4. Paroxysmal atrial fibrillation (CMS/[facility])        History of Present Illness    58 years old patient with past medical history significant for hyperlipidemia, paroxysmal atrial fibrillation  last echocardiographic study 2008, abnormal stress test leading to cardiac catheterization with PTCA stent of LAD and left circumflex system.  Patient is on appropriate medical management and during remarkably well.  He scheduled to the blood test done that included lipid profile through is a family doctor's office.  He denies orthopnea, PND, nauseous, vomiting.  Denies any polydipsia polyuria.  Denies any fever cough or chills.  Present dysuria hematuria.  Had history of chronic neck pain.Patient  Bit liberal on his diet intake time when the lipid profile done.    The patient unfortunately unable to take a statin every day due to significant muscle fatigue affecting his quality of life is taking atorvastatin 3-4 times per week.  5/2018  Total Cholesterol 0 - 199 mg/dL 246     Triglycerides 20 - 199 mg/dL 197    HDL Cholesterol 60 - 200 mg/dL 52     LDL Cholesterol  0 - 129 mg/dL 155     VLDL Cholesterol mg/dL 39.4    LDL/HDL Ratio 0.00 - 3.55 2.97      2017  · Left ventricular systolic function is normal. Estimated EF = 55%.  · Left ventricular diastolic dysfunction (grade I) consistent with impaired relaxation.  · Mild tricuspid valve regurgitation is present.    2008  · Left ventricular systolic function is normal. Estimated EF = 55%.  · Left ventricular diastolic dysfunction (grade I) consistent with impaired relaxation.  · Mild tricuspid valve regurgitation is present        SUBJECTIVE    No Known Allergies      Past Medical History:   Diagnosis Date   • Acquired equinus deformity of foot     • Anxiety    • Anxiety state    • Coronary arteriosclerosis    • Foreign body in conjunctival sac     OD   • Ganglion cyst     Ganglion/synovial cyst - hand      • Hyperlipidemia    • Insomnia    • Knee pain    • Neck pain     right upper extremity radiculopathy   • Pain     plantar heel pain   • Pain in right arm    • Plantar fasciitis    • Presbyopia          Past Surgical History:   Procedure Laterality Date   • CARDIAC CATHETERIZATION  03/04/2015    Cardiac cath 42908 (2)    Critical lesion in the circumflex coronary artery, proximal/mid, and successful PCI w/balloon angioplasty.Noncritical disease in the RCA and LAD.Normal LV systolic function with Ef of 55% to 60%.    • COLONOSCOPY N/A 5/18/2017    Procedure: COLONOSCOPY;  Surgeon: Curly Mann DO;  Location: [facility] ENDOSCOPY;  Service:    • INJECTION OF MEDICATION  10/10/2014    Dexamethasone (1)      • INJECTION OF MEDICATION  10/10/2014    Kenalog (1)    • KNEE ACL RECONSTRUCTION     • KNEE ARTHROSCOPY      Knee arthroscopy, surgery (1)      • OTHER SURGICAL HISTORY  10/10/2014    Inj(s) Tend-Sheath, Ligament, Single 61949 (1)     • TOTAL KNEE ARTHROPLASTY      Total knee replacement (1)    done in the late to mid 90's          Family History   Problem Relation Age of Onset   • Aneurysm Mother    • Heart attack Father          Social History     Social History   • Marital status:      Spouse name: N/A   • Number of children: N/A   • Years of education: N/A     Occupational History   • Not on file.     Social History Main Topics   • Smoking status: Former Smoker   • Smokeless tobacco: Never Used   • Alcohol use Yes      Comment: social   • Drug use: No   • Sexual activity: Defer     Other Topics Concern   • Not on file     Social History Narrative   • No narrative on file         Current Outpatient Prescriptions   Medication Sig Dispense Refill   • aspirin 81 MG chewable tablet Chew 81 mg Daily.     • atorvastatin (LIPITOR) 20 MG tablet Take 20  "mg by mouth. 3 times weekly     • clonazePAM (KlonoPIN) 0.5 MG tablet Take 1 tablet by mouth At Night As Needed (sleep). 30 tablet 3   • prasugrel (EFFIENT) 10 MG tablet TAKE ONE TABLET BY MOUTH DAILY 90 tablet 2   • sildenafil (VIAGRA) 100 MG tablet Take 1 tablet by mouth As Needed for erectile dysfunction. 0.5 to 1 as needed 1 hour before intercoarse 10 tablet 11     No current facility-administered medications for this visit.          OBJECTIVE    /84 (BP Location: Left arm)   Ht 190.5 cm (75\")   Wt 97.8 kg (215 lb 8 oz)   SpO2 98%   BMI 26.94 kg/m²         Review of Systems     Constitutional:  Denies recent weight loss, weight gain, fever or chills, no change in exercise tolerance     HENT:  Denies any hearing loss, epistaxis, hoarseness, or difficulty speaking.     Eyes: No blurring    Respiratory:  Denies dyspnea with exertion,no cough, wheezing, or hemoptysis.     Cardiovascular: See H&P    Gastrointestinal:  Denies change in bowel habits, dyspepsia, ulcer disease, hematochezia, or melena.     Endocrine: Negative for cold intolerance, heat intolerance, polydipsia, polyphagia and polyuria. Denies any history of weight change, heat/cold intolerance, polydipsia, polyuria     Genitourinary: Negative.      Musculoskeletal: Denies any history of arthritic symptoms or back problems     Skin:  Denies any change in hair or nails, rashes, or skin lesions.     Allergic/Immunologic: Negative.  Negative for environmental allergies, food allergies and immunocompromised state.     Neurological:  Denies any history of recurrent headaches, strokes, TIA, or seizure disorder.     Hematological: Denies any food allergies, seasonal allergies, bleeding disorders, or lymphadenopathy.     Psychiatric/Behavioral: Denies any history of depression, substance abuse, or change in cognitive function.         Physical Exam     Constitutional: Cooperative, alert and oriented, well-developed, well-nourished, in no acute distress. "     HENT:   Head: Normocephalic, normal hair patterns, no masses or tenderness.  Ears, Nose, and Throat: No gross abnormalities. No pallor or cyanosis. Dentition good.   Eyes: EOMS intact, PERRL, conjunctivae and lids unremarkable. Fundoscopic exam and visual fields not performed.   Neck: No palpable masses or adenopathy, no thyromegaly, no JVD, carotid pulses are full and equal bilaterally and without  Bruits.     Cardiovascular: Regular rhythm, S1 and S2 normal, no S3 or S4. Apical impulse not displaced. No murmurs, gallops, or rubs detected.     Pulmonary/Chest: Chest: normal symmetry, no tenderness to palpation, normal respiratory excursion, no intercostal retraction, no use of accessory muscles.            Pulmonary: Normal breath sounds. No rales or ronchi.    Abdominal: Abdomen soft, bowel sounds normoactive, no masses, no hepatosplenomegaly, non-tender, no bruits.     Musculoskeletal: No deformities, clubbing, cyanosis, erythema, or edema observed. There are no spinal abnormalities noted. Normal muscle strength and tone. Pulses full and equal in all extremities, no bruits auscultated.     Neurological: No gross motor or sensory deficits noted, affect appropriate, oriented to time, person, place.     Skin: Warm and dry to the touch, no apparent skin lesions or masses noted.     Psychiatric: He has a normal mood and affect. His behavior is normal. Judgment and thought content normal.           ECG 12 Lead  Date/Time: 7/24/2018 11:25 AM  Performed by: GENE OLIVARES  Authorized by: GENE OLIVARES   Comparison: not compared with previous ECG   Rhythm: sinus bradycardia              Lab Results   Component Value Date    WBC 7.5 07/10/2015    HGB 15.1 07/10/2015    HCT 45.1 07/10/2015    MCV 88.4 07/10/2015     07/10/2015     Lab Results   Component Value Date    GLUCOSE 126 (H) 10/31/2017    BUN 12 10/31/2017    CREATININE 1.09 10/31/2017    EGFRIFNONA 69 10/31/2017    BCR 11.0 10/31/2017    CO2 26.0  10/31/2017    CALCIUM 9.1 10/31/2017    ALBUMIN 4.30 10/31/2017    AST 29 10/31/2017    ALT 40 10/31/2017     Lab Results   Component Value Date    CHOL 246 (H) 05/15/2018     Lab Results   Component Value Date    TRIG 197 05/15/2018    TRIG 150 07/10/2015    TRIG 118 04/13/2015     Lab Results   Component Value Date    HDL 52 (L) 05/15/2018    HDL 53 (L) 07/10/2015    HDL 53 (L) 04/13/2015     No components found for: LDLCALC  Lab Results   Component Value Date     (H) 05/15/2018    LDL 95 10/31/2017     11/08/2016     No results found for: HDLLDLRATIO  No components found for: CHOLHDL  No results found for: HGBA1C  Lab Results   Component Value Date    TSH 2.51 07/10/2015           ASSESSMENT AND PLAN  1 CAD status post PTCA/ stent.  #2 paroxysmal atrial fibrillation no further recurrence.  #3 hyperlipidemia with recent         57 years old patient with history of CAD status post PTCA stent of LAD and left circumflex system doing remarkably well.  Last echocardiographic study in 2008 and repeat echo in 2017 reported to have good heart function.  Will arrange another echocardiographic study given the history of paroxysmal atrial fibrillation and CAD.  He will continue atorvastatin for management of hyperlipidemia with  lipid profile, aspirin and Effient with History of CAD and PTCA stent of LAD and left circumflex system.  No further recurrence of chest pain t . Will get lipid profile to reassess the status given the change in quality and eating habit   We will see him back in 6 month R depends on patient clinical conditions      Shayla was seen today for paroxsymal afib.    Diagnoses and all orders for this visit:    Essential hypertension    Hyperlipidemia, unspecified hyperlipidemia type    Coronary artery disease involving native coronary artery of native heart without angina pectoris    Paroxysmal atrial fibrillation (CMS/HCC)        Amber Dee MD  7/24/2018  11:00 AM

## 2018-08-10 ENCOUNTER — APPOINTMENT (OUTPATIENT)
Dept: LAB | Facility: HOSPITAL | Age: 58
End: 2018-08-10

## 2018-08-10 LAB
ARTICHOKE IGE QN: 91 MG/DL (ref 1–129)
CHOLEST SERPL-MCNC: 171 MG/DL (ref 0–199)
HDLC SERPL-MCNC: 48 MG/DL (ref 60–200)
LDLC/HDLC SERPL: 2.21 {RATIO} (ref 0–3.55)
TRIGL SERPL-MCNC: 84 MG/DL (ref 20–199)

## 2018-08-10 PROCEDURE — 36415 COLL VENOUS BLD VENIPUNCTURE: CPT | Performed by: INTERNAL MEDICINE

## 2018-08-10 PROCEDURE — 80061 LIPID PANEL: CPT | Performed by: INTERNAL MEDICINE

## 2018-09-24 RX ORDER — ATORVASTATIN CALCIUM 20 MG/1
TABLET, FILM COATED ORAL
Qty: 90 TABLET | Refills: 1 | Status: SHIPPED | OUTPATIENT
Start: 2018-09-24 | End: 2019-01-04

## 2018-11-20 ENCOUNTER — OFFICE VISIT (OUTPATIENT)
Dept: FAMILY MEDICINE CLINIC | Facility: CLINIC | Age: 58
End: 2018-11-20

## 2018-11-20 VITALS
HEIGHT: 75 IN | SYSTOLIC BLOOD PRESSURE: 128 MMHG | DIASTOLIC BLOOD PRESSURE: 80 MMHG | WEIGHT: 215 LBS | BODY MASS INDEX: 26.73 KG/M2

## 2018-11-20 DIAGNOSIS — E78.5 HYPERLIPIDEMIA, UNSPECIFIED HYPERLIPIDEMIA TYPE: Chronic | ICD-10-CM

## 2018-11-20 DIAGNOSIS — I25.10 CORONARY ARTERY DISEASE INVOLVING NATIVE CORONARY ARTERY OF NATIVE HEART WITHOUT ANGINA PECTORIS: Chronic | ICD-10-CM

## 2018-11-20 DIAGNOSIS — I10 ESSENTIAL HYPERTENSION: Chronic | ICD-10-CM

## 2018-11-20 DIAGNOSIS — N52.9 VASCULOGENIC ERECTILE DYSFUNCTION, UNSPECIFIED VASCULOGENIC ERECTILE DYSFUNCTION TYPE: ICD-10-CM

## 2018-11-20 DIAGNOSIS — G89.29 CHRONIC NECK PAIN: Primary | Chronic | ICD-10-CM

## 2018-11-20 DIAGNOSIS — M54.2 CHRONIC NECK PAIN: Primary | Chronic | ICD-10-CM

## 2018-11-20 PROCEDURE — 99214 OFFICE O/P EST MOD 30 MIN: CPT | Performed by: FAMILY MEDICINE

## 2018-11-20 RX ORDER — TRAMADOL HYDROCHLORIDE 50 MG/1
TABLET ORAL
Qty: 20 TABLET | Refills: 1 | OUTPATIENT
Start: 2018-11-20

## 2018-11-20 RX ORDER — TRAMADOL HYDROCHLORIDE 50 MG/1
50 TABLET ORAL EVERY 6 HOURS PRN
Qty: 20 TABLET | Refills: 1 | Status: SHIPPED | OUTPATIENT
Start: 2018-11-20 | End: 2019-01-08

## 2018-11-20 RX ORDER — SILDENAFIL 100 MG/1
100 TABLET, FILM COATED ORAL AS NEEDED
Qty: 10 TABLET | Refills: 11 | Status: SHIPPED | OUTPATIENT
Start: 2018-11-20 | End: 2019-08-05 | Stop reason: SDUPTHER

## 2018-11-20 NOTE — PROGRESS NOTES
Subjective   Shayla Kebede is a 58 y.o. male.     History of Present Illness   follow-up coronary disease hyperlipidemia and hypertension diet controlled chronic back and neck pain.  In the interim is kept weight down pressure down last cholesterol was below normal diet changes.  Uses sparingly and tramadol for neck pain flare.  All medicines have remained the same.  Spent some time today counseling on antiplatelet therapy diet and exercise.  Declines flu vaccine.    The following portions of the patient's history were reviewed and updated as appropriate: allergies, current medications, past family history, past medical history, past social history, past surgical history and problem list.    Review of Systems   Constitutional: Negative for activity change, appetite change, fatigue and unexpected weight change.   HENT: Negative for trouble swallowing and voice change.    Eyes: Negative for redness and visual disturbance.   Respiratory: Negative for cough and wheezing.    Cardiovascular: Negative for chest pain and palpitations.   Gastrointestinal: Negative for abdominal pain, constipation, diarrhea, nausea and vomiting.   Genitourinary: Negative for urgency.   Musculoskeletal: Positive for back pain (Intermittent chronic stable) and neck pain. Negative for joint swelling.   Neurological: Negative for syncope and headaches.   Hematological: Negative for adenopathy.   Psychiatric/Behavioral: Negative for sleep disturbance.       Objective   Physical Exam   Constitutional: He is oriented to person, place, and time. He appears well-developed.   HENT:   Head: Normocephalic.   Nose: Nose normal.   Eyes: Pupils are equal, round, and reactive to light.   Neck: Normal range of motion. No thyromegaly present.   Cardiovascular: Normal rate, regular rhythm, normal heart sounds and intact distal pulses. Exam reveals no gallop and no friction rub.   No murmur heard.  Pulmonary/Chest: Breath sounds normal.   Abdominal:  Soft. He exhibits no distension and no mass. There is no tenderness.   Musculoskeletal: Normal range of motion.   Neurological: He is alert and oriented to person, place, and time. He has normal reflexes.   Skin: Skin is warm and dry.   Psychiatric: He has a normal mood and affect. His behavior is normal. Judgment and thought content normal.       Assessment/Plan   Shayla was seen today for follow-up.    Diagnoses and all orders for this visit:    Chronic neck pain  -     traMADol (ULTRAM) 50 MG tablet; Take 1 tablet by mouth Every 6 (Six) Hours As Needed for Moderate Pain  (back and neck).    Vasculogenic erectile dysfunction, unspecified vasculogenic erectile dysfunction type  -     sildenafil (VIAGRA) 100 MG tablet; Take 1 tablet by mouth As Needed for erectile dysfunction. 0.5 to 1 as needed 1 hour before intercoarse    Coronary artery disease involving native coronary artery of native heart without angina pectoris    Essential hypertension    Hyperlipidemia, unspecified hyperlipidemia type       one her time hydration  on fall prevention etc. recheck 6 months betime for lab

## 2018-12-17 ENCOUNTER — OFFICE VISIT (OUTPATIENT)
Dept: FAMILY MEDICINE CLINIC | Facility: CLINIC | Age: 58
End: 2018-12-17

## 2018-12-17 VITALS
WEIGHT: 215 LBS | DIASTOLIC BLOOD PRESSURE: 88 MMHG | HEIGHT: 75 IN | SYSTOLIC BLOOD PRESSURE: 132 MMHG | BODY MASS INDEX: 26.73 KG/M2

## 2018-12-17 DIAGNOSIS — G89.29 CHRONIC LEFT-SIDED LOW BACK PAIN WITH LEFT-SIDED SCIATICA: Primary | ICD-10-CM

## 2018-12-17 DIAGNOSIS — M54.42 CHRONIC LEFT-SIDED LOW BACK PAIN WITH LEFT-SIDED SCIATICA: Primary | ICD-10-CM

## 2018-12-17 PROCEDURE — 99213 OFFICE O/P EST LOW 20 MIN: CPT | Performed by: FAMILY MEDICINE

## 2018-12-17 RX ORDER — PRASUGREL 10 MG/1
TABLET, FILM COATED ORAL
Qty: 90 TABLET | Refills: 1 | Status: SHIPPED | OUTPATIENT
Start: 2018-12-17 | End: 2019-07-02 | Stop reason: SDUPTHER

## 2018-12-17 RX ORDER — LIDOCAINE 50 MG/G
PATCH TOPICAL
COMMUNITY
Start: 2018-12-16 | End: 2019-01-04

## 2018-12-17 NOTE — PROGRESS NOTES
Subjective   Shayla Kebede is a 58 y.o. male.     History of Present Illness   requesting evaluation chronic back pain states 18 month history of low back pain pelvic tilt radiating to the left.  Occasional severe pain left buttock going down his leg.  Has been doing home physical therapy including yoga stretching etc.  Also been on medication including anti-inflammatories tramadol etc.  Continuing with left leg pain and sciatic pain despite all this over several months.  Has a history of chronic neck pain as mentioned previously.    The following portions of the patient's history were reviewed and updated as appropriate: allergies, current medications, past family history, past medical history, past social history, past surgical history and problem list.    Review of Systems   Constitutional: Negative for activity change, appetite change, fatigue and unexpected weight change.   HENT: Negative for trouble swallowing and voice change.    Eyes: Negative for redness and visual disturbance.   Respiratory: Negative for cough and wheezing.    Cardiovascular: Negative for chest pain and palpitations.   Gastrointestinal: Negative for abdominal pain, constipation, diarrhea, nausea and vomiting.   Genitourinary: Negative for urgency.   Musculoskeletal: Positive for back pain. Negative for joint swelling.   Neurological: Negative for syncope and headaches.   Hematological: Negative for adenopathy.   Psychiatric/Behavioral: Negative for sleep disturbance.       Objective   Physical Exam   Constitutional: He appears well-developed.   HENT:   Head: Normocephalic.   Eyes: Pupils are equal, round, and reactive to light.   Neck: Normal range of motion.   Cardiovascular: Normal rate.   Pulmonary/Chest: Effort normal.   Abdominal: Soft.   Musculoskeletal:        Lumbar back: He exhibits tenderness.   Mild pelvic tilt right greater than left.  Tenderness to full rotation flexion.  Positive straight-leg raise on the left at  about 75°.  Right 90°.  Gait normal   Neurological:   Reflex Scores:       Patellar reflexes are 2+ on the right side and 2+ on the left side.       Achilles reflexes are 2+ on the right side and 2+ on the left side.  Psychiatric: He has a normal mood and affect. His speech is normal and behavior is normal.   No distress       Assessment/Plan   Shayla was seen today for follow-up.    Diagnoses and all orders for this visit:    Chronic left-sided low back pain with left-sided sciatica  -     MRI Lumbar Spine Without Contrast; Future       need to image anatomy.  If no surgical lesion then switch focus to chronic pain control with ongoing yoga or yvonne chi switch off of acute on chronic pain therapy.  Counseled on same we'll adjust based after study

## 2018-12-19 ENCOUNTER — TELEPHONE (OUTPATIENT)
Dept: FAMILY MEDICINE CLINIC | Facility: CLINIC | Age: 58
End: 2018-12-19

## 2018-12-19 DIAGNOSIS — G89.29 CHRONIC LEFT-SIDED LOW BACK PAIN WITH LEFT-SIDED SCIATICA: Primary | ICD-10-CM

## 2018-12-19 DIAGNOSIS — M54.42 CHRONIC LEFT-SIDED LOW BACK PAIN WITH LEFT-SIDED SCIATICA: Primary | ICD-10-CM

## 2018-12-19 RX ORDER — TIZANIDINE HYDROCHLORIDE 2 MG/1
2 CAPSULE, GELATIN COATED ORAL 3 TIMES DAILY
Qty: 60 CAPSULE | Refills: 1 | Status: SHIPPED | OUTPATIENT
Start: 2018-12-19 | End: 2018-12-26

## 2018-12-19 RX ORDER — TRAMADOL HYDROCHLORIDE 50 MG/1
TABLET ORAL
Qty: 60 TABLET | Refills: 1 | Status: SHIPPED | OUTPATIENT
Start: 2018-12-19 | End: 2019-01-04

## 2018-12-19 NOTE — TELEPHONE ENCOUNTER
Tried to call the patient but didn't get an answer. Dr Bernstein said that he needs to wait until after he gets his MRI.

## 2018-12-19 NOTE — TELEPHONE ENCOUNTER
Pt called and said he is having a lot of trouble sleeping and daily functioning due to his back pain. He is wondering if he can get a referral to a pain specialist for a little bit stronger med until they can figure out what is going on with his MRI. Thanks!

## 2018-12-19 NOTE — TELEPHONE ENCOUNTER
PAULA ARTHUR IS NOT ASKING FOR A LOT OF PAIN MEDS  BUT JUST A FEW TO HELP HIM DEAL THE PAIN AND SLEEP TIL HE GET THE MRI AND INTO PAIN CLINIC..he IS NOT A DRUG SEEKER BY ALL MEANS BUT TRYING TO WORK AND TRAVEL WITH HIS WORK IS NOT HELPING AT ALL  HE ALSO SAID HE HAS NOT RECEIVED ANY CALLS TODAY  [phone]

## 2018-12-26 ENCOUNTER — OFFICE VISIT (OUTPATIENT)
Dept: FAMILY MEDICINE CLINIC | Facility: CLINIC | Age: 58
End: 2018-12-26

## 2018-12-26 VITALS
RESPIRATION RATE: 20 BRPM | WEIGHT: 214 LBS | OXYGEN SATURATION: 98 % | HEART RATE: 64 BPM | HEIGHT: 75 IN | TEMPERATURE: 97.4 F | BODY MASS INDEX: 26.61 KG/M2 | SYSTOLIC BLOOD PRESSURE: 138 MMHG | DIASTOLIC BLOOD PRESSURE: 98 MMHG

## 2018-12-26 DIAGNOSIS — M54.32 SCIATICA OF LEFT SIDE: ICD-10-CM

## 2018-12-26 DIAGNOSIS — G89.29 CHRONIC MIDLINE LOW BACK PAIN WITH LEFT-SIDED SCIATICA: Primary | ICD-10-CM

## 2018-12-26 DIAGNOSIS — M54.42 CHRONIC MIDLINE LOW BACK PAIN WITH LEFT-SIDED SCIATICA: Primary | ICD-10-CM

## 2018-12-26 PROCEDURE — 99214 OFFICE O/P EST MOD 30 MIN: CPT | Performed by: FAMILY MEDICINE

## 2018-12-26 RX ORDER — METHOCARBAMOL 500 MG/1
500 TABLET, FILM COATED ORAL 4 TIMES DAILY
Qty: 56 TABLET | Refills: 1 | Status: SHIPPED | OUTPATIENT
Start: 2018-12-26 | End: 2019-01-04

## 2018-12-27 ENCOUNTER — TELEPHONE (OUTPATIENT)
Dept: FAMILY MEDICINE CLINIC | Facility: CLINIC | Age: 58
End: 2018-12-27

## 2018-12-28 ENCOUNTER — TELEPHONE (OUTPATIENT)
Dept: FAMILY MEDICINE CLINIC | Facility: CLINIC | Age: 58
End: 2018-12-28

## 2018-12-31 ENCOUNTER — OFFICE VISIT (OUTPATIENT)
Dept: ORTHOPEDIC SURGERY | Facility: CLINIC | Age: 58
End: 2018-12-31

## 2018-12-31 VITALS — BODY MASS INDEX: 26.36 KG/M2 | HEIGHT: 75 IN | WEIGHT: 212 LBS

## 2018-12-31 DIAGNOSIS — M54.5 LOW BACK PAIN, UNSPECIFIED BACK PAIN LATERALITY, UNSPECIFIED CHRONICITY, WITH SCIATICA PRESENCE UNSPECIFIED: Primary | ICD-10-CM

## 2018-12-31 PROCEDURE — 99213 OFFICE O/P EST LOW 20 MIN: CPT | Performed by: ORTHOPAEDIC SURGERY

## 2018-12-31 NOTE — PROGRESS NOTES
Shayla Kebede is a 58 y.o. male   Primary provider:  Piyush Bernstein MD       Chief Complaint   Patient presents with   • Lumbar Spine - Pain   • Back Pain     xrays done at [facility] urgent care       HISTORY OF PRESENT ILLNESS:  Patient here today for pain in low back started first of November.  He was seen at [facility] urgent care and xrays were performed.    58 y pain of the low back, the pain is located in the low back, present for 2 months, gradual, constant, ache, sharp, sitting aggravates the pain.  Pain does not radiate.  Tramadol helps with pain.  No numbness no weakness.  No fevers or chills, sleep interruption is present.      History of Present Illness     CONCURRENT MEDICAL HISTORY:    Past Medical History:   Diagnosis Date   • Acquired equinus deformity of foot    • Anxiety    • Anxiety state    • Coronary arteriosclerosis    • Foreign body in conjunctival sac     OD   • Ganglion cyst     Ganglion/synovial cyst - hand      • Hyperlipidemia    • Insomnia    • Knee pain    • Neck pain     right upper extremity radiculopathy   • Pain     plantar heel pain   • Pain in right arm    • Plantar fasciitis    • Presbyopia        No Known Allergies      Current Outpatient Medications:   •  aspirin 81 MG chewable tablet, Chew 81 mg Daily., Disp: , Rfl:   •  atorvastatin (LIPITOR) 20 MG tablet, TAKE ONE TABLET BY MOUTH DAILY, Disp: 90 tablet, Rfl: 1  •  clonazePAM (KlonoPIN) 0.5 MG tablet, Take 1 tablet by mouth At Night As Needed (sleep)., Disp: 30 tablet, Rfl: 3  •  prasugrel (EFFIENT) 10 MG tablet, TAKE ONE TABLET BY MOUTH DAILY, Disp: 90 tablet, Rfl: 1  •  sildenafil (VIAGRA) 100 MG tablet, Take 1 tablet by mouth As Needed for erectile dysfunction. 0.5 to 1 as needed 1 hour before intercoarse, Disp: 10 tablet, Rfl: 11  •  traMADol (ULTRAM) 50 MG tablet, Take 1 tablet by mouth Every 6 (Six) Hours As Needed for Moderate Pain  (back and neck)., Disp: 20 tablet, Rfl: 1  •  traMADol (ULTRAM) 50 MG tablet, 2 q 4-6hrs  prn back and neck pain, Disp: 60 tablet, Rfl: 1  •  lidocaine (LIDODERM) 5 %, , Disp: , Rfl:   •  methocarbamol (ROBAXIN) 500 MG tablet, Take 1 tablet by mouth 4 (Four) Times a Day., Disp: 56 tablet, Rfl: 1    Past Surgical History:   Procedure Laterality Date   • CARDIAC CATHETERIZATION  03/04/2015    Cardiac cath 93986 (2)    Critical lesion in the circumflex coronary artery, proximal/mid, and successful PCI w/balloon angioplasty.Noncritical disease in the RCA and LAD.Normal LV systolic function with Ef of 55% to 60%.    • COLONOSCOPY N/A 5/18/2017    Procedure: COLONOSCOPY;  Surgeon: Curly Mann DO;  Location: [facility] ENDOSCOPY;  Service:    • INJECTION OF MEDICATION  10/10/2014    Dexamethasone (1)      • INJECTION OF MEDICATION  10/10/2014    Kenalog (1)    • KNEE ACL RECONSTRUCTION     • KNEE ARTHROSCOPY      Knee arthroscopy, surgery (1)      • OTHER SURGICAL HISTORY  10/10/2014    Inj(s) Tend-Sheath, Ligament, Single 54557 (1)     • TOTAL KNEE ARTHROPLASTY      Total knee replacement (1)    done in the late to mid 90's        Family History   Problem Relation Age of Onset   • Aneurysm Mother    • Heart attack Father        Social History     Socioeconomic History   • Marital status:      Spouse name: Not on file   • Number of children: Not on file   • Years of education: Not on file   • Highest education level: Not on file   Social Needs   • Financial resource strain: Not on file   • Food insecurity - worry: Not on file   • Food insecurity - inability: Not on file   • Transportation needs - medical: Not on file   • Transportation needs - non-medical: Not on file   Occupational History   • Not on file   Tobacco Use   • Smoking status: Former Smoker   • Smokeless tobacco: Never Used   Substance and Sexual Activity   • Alcohol use: Yes     Comment: social   • Drug use: No   • Sexual activity: Defer   Other Topics Concern   • Not on file   Social History Narrative   • Not on file        Review of  "Systems   Constitutional: Negative.    HENT: Negative.    Eyes: Negative.    Respiratory: Negative.    Cardiovascular: Negative.    Gastrointestinal: Negative.    Endocrine: Negative.    Genitourinary: Negative.    Musculoskeletal: Negative.    Skin: Negative.    Allergic/Immunologic: Negative.    Neurological: Negative.    Hematological: Negative.    Psychiatric/Behavioral: Negative.        PHYSICAL EXAMINATION:       Ht 190.5 cm (75\")   Wt 96.2 kg (212 lb)   BMI 26.50 kg/m²     Physical Exam   Constitutional: He appears well-developed.   HENT:   Head: Normocephalic and atraumatic.   Eyes: Pupils are equal, round, and reactive to light. Right eye exhibits no discharge. Left eye exhibits no discharge.   Neck: Normal range of motion. No JVD present. No tracheal deviation present. No thyromegaly present.   Cardiovascular: Normal rate and intact distal pulses. Exam reveals no gallop and no friction rub.   No murmur heard.  Pulmonary/Chest: Effort normal and breath sounds normal. No respiratory distress. He has no wheezes. He has no rales. He exhibits no tenderness.   Abdominal: Soft. Bowel sounds are normal. He exhibits no distension. There is no tenderness. There is no guarding.   Musculoskeletal: He exhibits no edema, tenderness or deformity.   Lymphadenopathy:     He has no cervical adenopathy.   Neurological: He is alert. He has normal reflexes. He displays normal reflexes. No cranial nerve deficit. Coordination normal.   Skin: Skin is warm. No rash noted. No erythema.   Psychiatric: He has a normal mood and affect. His behavior is normal. Thought content normal.       GAIT:     []  Normal  []  Antalgic    Assistive device: []  None  []  Walker     []  Crutches  []  Cane     []  Wheelchair  []  Stretcher    Right Ankle Exam     Muscle Strength   Dorsiflexion:  5/5  Plantar flexion:  5/5  Anterior tibial:  5/5  Posterior tibial:  5/5  Gastrocsoleus:  5/5  Peroneal muscle:  5/5      Left Ankle Exam     Muscle " Strength   Dorsiflexion:  5/5   Plantar flexion:  5/5   Anterior tibial:  5/5   Posterior tibial:  5/5  Gastrocsoleus:  5/5  Peroneal muscle:  5/5      Right Hip Exam     Range of Motion   External rotation: normal   Internal rotation: normal     Muscle Strength   Abduction: 5/5   Flexion: 5/5       Left Hip Exam     Range of Motion   External rotation: normal   Internal rotation: normal     Muscle Strength   Abduction: 5/5   Flexion: 5/5       Back Exam     Tenderness   The patient is experiencing tenderness in the lumbar.    Range of Motion   Extension: 30   Flexion:  90 normal   Lateral bend right: 20   Lateral bend left: 20   Rotation right: 30   Rotation left: 30     Muscle Strength   Right Quadriceps:  5/5   Left Quadriceps:  5/5   Right Hamstrings:  5/5   Left Hamstrings:  5/5     Tests   Straight leg raise right: negative  Straight leg raise left: negative    Reflexes   Patellar: 2/4  Achilles: 2/4  Biceps: 2/4  Babinski's sign: normal     Other   Sensation: normal  Gait: normal   Erythema: no back redness  Scars: absent                  Xr Spine Lumbar 4+ View    Result Date: 12/26/2018  Narrative: Five-view lumbar spine HISTORY: Low back pain. Lumbago with left-sided sciatica. Chronic pain. AP, lateral and oblique radiographs of the lumbar spine and spot film of the lumbosacral junction were obtained. COMPARISON: None. There is a normal lordosis. Vertebral height and alignment are maintained. No fracture. The pedicles are intact. Congenitally short pedicles placing the patient at increased risk for spinal stenosis. Minimal degenerative disease L3-4, L4-5 and L5-S1. Moderate amount retained feces throughout the colon. Atherosclerotic calcification abdominal aorta and iliac arteries.     Impression: CONCLUSION: Congenitally short pedicles placing the patient at increased risk for spinal stenosis. Minimal degenerative disease L3-4, L4-5 and L5-S1. Moderate amount retained feces throughout the colon. [id number]  Electronically signed by:  Johnson Irizarry MD  12/26/2018 7:23 PM CST Workstation: [id number]    Xray of lumbar spine decreased disc height of L4-5 and L5-S1, mild endplate degenerative changes.        ASSESSMENT:    Diagnoses and all orders for this visit:    Low back pain, unspecified back pain laterality, unspecified chronicity, with sciatica presence unspecified          PLAN      Recommend MRI of lumbar spine for assessment.    Shawn Latham MD

## 2019-01-03 DIAGNOSIS — M54.32 BACK PAIN WITH LEFT-SIDED SCIATICA: Primary | ICD-10-CM

## 2019-01-04 ENCOUNTER — HOSPITAL ENCOUNTER (EMERGENCY)
Facility: HOSPITAL | Age: 59
Discharge: HOME | End: 2019-01-04
Attending: FAMILY MEDICINE | Admitting: FAMILY MEDICINE

## 2019-01-04 ENCOUNTER — APPOINTMENT (OUTPATIENT)
Dept: GENERAL RADIOLOGY | Facility: HOSPITAL | Age: 59
End: 2019-01-04

## 2019-01-04 VITALS
WEIGHT: 215 LBS | BODY MASS INDEX: 26.73 KG/M2 | SYSTOLIC BLOOD PRESSURE: 122 MMHG | TEMPERATURE: 96.6 F | HEART RATE: 77 BPM | HEIGHT: 75 IN | DIASTOLIC BLOOD PRESSURE: 75 MMHG | OXYGEN SATURATION: 97 % | RESPIRATION RATE: 20 BRPM

## 2019-01-04 DIAGNOSIS — I48.91 ATRIAL FIBRILLATION, CONTROLLED (HCC): Primary | ICD-10-CM

## 2019-01-04 DIAGNOSIS — G89.29 CHRONIC MIDLINE LOW BACK PAIN WITHOUT SCIATICA: ICD-10-CM

## 2019-01-04 DIAGNOSIS — M54.50 CHRONIC MIDLINE LOW BACK PAIN WITHOUT SCIATICA: ICD-10-CM

## 2019-01-04 LAB
ALBUMIN SERPL-MCNC: 4.5 G/DL (ref 3.4–4.8)
ALBUMIN/GLOB SERPL: 1.6 G/DL (ref 1.1–1.8)
ALP SERPL-CCNC: 57 U/L (ref 38–126)
ALT SERPL W P-5'-P-CCNC: 26 U/L (ref 21–72)
ANION GAP SERPL CALCULATED.3IONS-SCNC: 9 MMOL/L (ref 5–15)
AST SERPL-CCNC: 38 U/L (ref 17–59)
BASOPHILS # BLD AUTO: 0.03 10*3/MM3 (ref 0–0.2)
BASOPHILS NFR BLD AUTO: 0.6 % (ref 0–2)
BILIRUB SERPL-MCNC: 0.4 MG/DL (ref 0.2–1.3)
BILIRUB UR QL STRIP: NEGATIVE
BUN BLD-MCNC: 14 MG/DL (ref 7–21)
BUN/CREAT SERPL: 14.1 (ref 7–25)
CALCIUM SPEC-SCNC: 9.3 MG/DL (ref 8.4–10.2)
CHLORIDE SERPL-SCNC: 97 MMOL/L (ref 95–110)
CLARITY UR: CLEAR
CO2 SERPL-SCNC: 30 MMOL/L (ref 22–31)
COLOR UR: YELLOW
CREAT BLD-MCNC: 0.99 MG/DL (ref 0.7–1.3)
DEPRECATED RDW RBC AUTO: 41.1 FL (ref 35.1–43.9)
EOSINOPHIL # BLD AUTO: 0.29 10*3/MM3 (ref 0–0.7)
EOSINOPHIL NFR BLD AUTO: 5.6 % (ref 0–7)
ERYTHROCYTE [DISTWIDTH] IN BLOOD BY AUTOMATED COUNT: 12.6 % (ref 11.5–14.5)
GFR SERPL CREATININE-BSD FRML MDRD: 77 ML/MIN/1.73 (ref 56–130)
GLOBULIN UR ELPH-MCNC: 2.8 GM/DL (ref 2.3–3.5)
GLUCOSE BLD-MCNC: 113 MG/DL (ref 60–100)
GLUCOSE UR STRIP-MCNC: NEGATIVE MG/DL
HCT VFR BLD AUTO: 45.9 % (ref 39–49)
HGB BLD-MCNC: 15.8 G/DL (ref 13.7–17.3)
HGB UR QL STRIP.AUTO: NEGATIVE
HOLD SPECIMEN: NORMAL
HOLD SPECIMEN: NORMAL
IMM GRANULOCYTES # BLD AUTO: 0.01 10*3/MM3 (ref 0–0.02)
IMM GRANULOCYTES NFR BLD AUTO: 0.2 % (ref 0–0.5)
KETONES UR QL STRIP: NEGATIVE
LEUKOCYTE ESTERASE UR QL STRIP.AUTO: NEGATIVE
LYMPHOCYTES # BLD AUTO: 2.2 10*3/MM3 (ref 0.6–4.2)
LYMPHOCYTES NFR BLD AUTO: 42.6 % (ref 10–50)
MCH RBC QN AUTO: 30.7 PG (ref 26.5–34)
MCHC RBC AUTO-ENTMCNC: 34.4 G/DL (ref 31.5–36.3)
MCV RBC AUTO: 89.1 FL (ref 80–98)
MONOCYTES # BLD AUTO: 0.39 10*3/MM3 (ref 0–0.9)
MONOCYTES NFR BLD AUTO: 7.5 % (ref 0–12)
NEUTROPHILS # BLD AUTO: 2.25 10*3/MM3 (ref 2–8.6)
NEUTROPHILS NFR BLD AUTO: 43.5 % (ref 37–80)
NITRITE UR QL STRIP: NEGATIVE
NT-PROBNP SERPL-MCNC: 180 PG/ML (ref 0–900)
PH UR STRIP.AUTO: 6.5 [PH] (ref 5–9)
PLATELET # BLD AUTO: 281 10*3/MM3 (ref 150–450)
PMV BLD AUTO: 10.2 FL (ref 8–12)
POTASSIUM BLD-SCNC: 3.5 MMOL/L (ref 3.5–5.1)
PROT SERPL-MCNC: 7.3 G/DL (ref 6.3–8.6)
PROT UR QL STRIP: NEGATIVE
RBC # BLD AUTO: 5.15 10*6/MM3 (ref 4.37–5.74)
SODIUM BLD-SCNC: 136 MMOL/L (ref 137–145)
SP GR UR STRIP: 1.02 (ref 1–1.03)
TROPONIN I SERPL-MCNC: <0.012 NG/ML
UROBILINOGEN UR QL STRIP: NORMAL
WBC NRBC COR # BLD: 5.17 10*3/MM3 (ref 3.2–9.8)
WHOLE BLOOD HOLD SPECIMEN: NORMAL
WHOLE BLOOD HOLD SPECIMEN: NORMAL

## 2019-01-04 PROCEDURE — 84484 ASSAY OF TROPONIN QUANT: CPT | Performed by: FAMILY MEDICINE

## 2019-01-04 PROCEDURE — 99285 EMERGENCY DEPT VISIT HI MDM: CPT

## 2019-01-04 PROCEDURE — 93005 ELECTROCARDIOGRAM TRACING: CPT | Performed by: FAMILY MEDICINE

## 2019-01-04 PROCEDURE — 71045 X-RAY EXAM CHEST 1 VIEW: CPT

## 2019-01-04 PROCEDURE — 85025 COMPLETE CBC W/AUTO DIFF WBC: CPT | Performed by: FAMILY MEDICINE

## 2019-01-04 PROCEDURE — 81003 URINALYSIS AUTO W/O SCOPE: CPT | Performed by: FAMILY MEDICINE

## 2019-01-04 PROCEDURE — 93010 ELECTROCARDIOGRAM REPORT: CPT | Performed by: INTERNAL MEDICINE

## 2019-01-04 PROCEDURE — 83880 ASSAY OF NATRIURETIC PEPTIDE: CPT | Performed by: FAMILY MEDICINE

## 2019-01-04 PROCEDURE — 93005 ELECTROCARDIOGRAM TRACING: CPT

## 2019-01-04 PROCEDURE — 80053 COMPREHEN METABOLIC PANEL: CPT | Performed by: FAMILY MEDICINE

## 2019-01-04 PROCEDURE — 96360 HYDRATION IV INFUSION INIT: CPT

## 2019-01-04 RX ORDER — OXYCODONE HYDROCHLORIDE AND ACETAMINOPHEN 5; 325 MG/1; MG/1
1 TABLET ORAL EVERY 6 HOURS PRN
Qty: 12 TABLET | Refills: 0 | Status: SHIPPED | OUTPATIENT
Start: 2019-01-04 | End: 2019-01-24

## 2019-01-04 RX ORDER — OXYCODONE AND ACETAMINOPHEN 7.5; 325 MG/1; MG/1
1 TABLET ORAL ONCE
Status: COMPLETED | OUTPATIENT
Start: 2019-01-04 | End: 2019-01-04

## 2019-01-04 RX ORDER — SODIUM CHLORIDE 0.9 % (FLUSH) 0.9 %
10 SYRINGE (ML) INJECTION AS NEEDED
Status: DISCONTINUED | OUTPATIENT
Start: 2019-01-04 | End: 2019-01-05 | Stop reason: HOSPADM

## 2019-01-04 RX ADMIN — SODIUM CHLORIDE 1000 ML: 900 INJECTION, SOLUTION INTRAVENOUS at 21:54

## 2019-01-04 RX ADMIN — OXYCODONE HYDROCHLORIDE AND ACETAMINOPHEN 1 TABLET: 7.5; 325 TABLET ORAL at 22:18

## 2019-01-05 NOTE — ED PROVIDER NOTES
Subjective     History provided by:  Patient   used: No    Palpitations   Palpitations quality:  Irregular  Onset quality:  Gradual  Duration:  1 day  Timing:  Intermittent  Progression:  Unchanged  Chronicity:  Recurrent  Relieved by:  Nothing  Worsened by:  Nothing  Associated symptoms: no chest pain    patient also complain about back pain.   Review of Systems   Cardiovascular: Positive for palpitations. Negative for chest pain.       Past Medical History:   Diagnosis Date   • Acquired equinus deformity of foot    • Anxiety    • Anxiety state    • Coronary arteriosclerosis    • Foreign body in conjunctival sac     OD   • Ganglion cyst     Ganglion/synovial cyst - hand      • Hyperlipidemia    • Insomnia    • Knee pain    • Neck pain     right upper extremity radiculopathy   • Pain     plantar heel pain   • Pain in right arm    • Plantar fasciitis    • Presbyopia        No Known Allergies    Past Surgical History:   Procedure Laterality Date   • CARDIAC CATHETERIZATION  03/04/2015    Cardiac cath 46310 (2)    Critical lesion in the circumflex coronary artery, proximal/mid, and successful PCI w/balloon angioplasty.Noncritical disease in the RCA and LAD.Normal LV systolic function with Ef of 55% to 60%.    • COLONOSCOPY N/A 5/18/2017    Procedure: COLONOSCOPY;  Surgeon: Curly Mann DO;  Location: [facility] ENDOSCOPY;  Service:    • INJECTION OF MEDICATION  10/10/2014    Dexamethasone (1)      • INJECTION OF MEDICATION  10/10/2014    Kenalog (1)    • KNEE ACL RECONSTRUCTION     • KNEE ARTHROSCOPY      Knee arthroscopy, surgery (1)      • OTHER SURGICAL HISTORY  10/10/2014    Inj(s) Tend-Sheath, Ligament, Single 32897 (1)     • TOTAL KNEE ARTHROPLASTY      Total knee replacement (1)    done in the late to mid 90's        Family History   Problem Relation Age of Onset   • Aneurysm Mother    • Heart attack Father        Social History     Socioeconomic History   • Marital status:       Spouse name: Not on file   • Number of children: Not on file   • Years of education: Not on file   • Highest education level: Not on file   Tobacco Use   • Smoking status: Former Smoker   • Smokeless tobacco: Never Used   Substance and Sexual Activity   • Alcohol use: Yes     Frequency: Never     Comment: social   • Drug use: No   • Sexual activity: Defer           Objective   Physical Exam   Constitutional: He is oriented to person, place, and time. He appears well-developed and well-nourished.   HENT:   Head: Normocephalic.   Right Ear: External ear normal.   Left Ear: External ear normal.   Nose: Nose normal.   Mouth/Throat: Oropharynx is clear and moist.   Neck: Normal range of motion. Neck supple.   Cardiovascular: Normal rate, regular rhythm, normal heart sounds and intact distal pulses.   Pulmonary/Chest: Effort normal and breath sounds normal.   Abdominal: Soft. Bowel sounds are normal.   Musculoskeletal: Normal range of motion.   Neurological: He is alert and oriented to person, place, and time.   Skin: Skin is warm. Capillary refill takes less than 2 seconds.   Nursing note and vitals reviewed.      Procedures           ED Course        Labs Reviewed   COMPREHENSIVE METABOLIC PANEL - Abnormal; Notable for the following components:       Result Value    Glucose 113 (*)     Sodium 136 (*)     All other components within normal limits   TROPONIN (IN-HOUSE) - Normal   BNP (IN-HOUSE) - Normal   CBC WITH AUTO DIFFERENTIAL - Normal   URINALYSIS W/ CULTURE IF INDICATED - Normal    Narrative:     Urine microscopic not indicated.   RAINBOW DRAW    Narrative:     The following orders were created for panel order [place] Draw.  Procedure                               Abnormality         Status                     ---------                               -----------         ------                     Light Blue Top[[id number]]                                   Final result               Green Top (Gel)[[id number]]                                   Final result               Lavender Top[[id number]]                                     Final result               Gold Top - SST[[id number]]                                   Final result                 Please view results for these tests on the individual orders.   TROPONIN (IN-HOUSE)   LIGHT BLUE TOP   GREEN TOP   LAVENDER TOP   GOLD TOP - SST   CBC AND DIFFERENTIAL    Narrative:     The following orders were created for panel order CBC & Differential.  Procedure                               Abnormality         Status                     ---------                               -----------         ------                     CBC Auto Differential[[id number]]        Normal              Final result                 Please view results for these tests on the individual orders.       XR Chest 1 View   Final Result   No active disease.      Electronically signed by:  Cedrick Pearson  1/4/2019 10:22 PM   [facility] Workstation: [id number]                [facility]      Final diagnoses:   Atrial fibrillation, controlled (CMS/HCC)   Chronic midline low back pain without sciatica            Rudy Jamil MD  01/05/19 3282

## 2019-01-05 NOTE — ED NOTES
Patient comes in to ED today for hypertension and palpitations that started today.  Patient states that he has taken his medications today to try to get heart rate down- pain meds, klonopin.  Patient states that he has a hx of afib.     Janice Whitlock, RN  01/04/19 2121

## 2019-01-07 ENCOUNTER — TELEPHONE (OUTPATIENT)
Dept: ORTHOPEDIC SURGERY | Facility: CLINIC | Age: 59
End: 2019-01-07

## 2019-01-08 ENCOUNTER — OFFICE VISIT (OUTPATIENT)
Dept: FAMILY MEDICINE CLINIC | Facility: CLINIC | Age: 59
End: 2019-01-08

## 2019-01-08 VITALS
WEIGHT: 208 LBS | SYSTOLIC BLOOD PRESSURE: 136 MMHG | DIASTOLIC BLOOD PRESSURE: 90 MMHG | HEIGHT: 75 IN | BODY MASS INDEX: 25.86 KG/M2

## 2019-01-08 DIAGNOSIS — I48.0 PAROXYSMAL ATRIAL FIBRILLATION (HCC): Primary | Chronic | ICD-10-CM

## 2019-01-08 DIAGNOSIS — M54.40 BACK PAIN OF LUMBAR REGION WITH SCIATICA: ICD-10-CM

## 2019-01-08 PROCEDURE — 99213 OFFICE O/P EST LOW 20 MIN: CPT | Performed by: FAMILY MEDICINE

## 2019-01-08 NOTE — PROGRESS NOTES
Subjective   Shaylamerritt Kebede is a 58 y.o. male.     History of Present Illness  ER follow-up.  [place] be in atrial fibrillation.  However there are 2 ER EKG reports one stating atrial fibrillation when stating junctional rhythm.  Parent patient foot back in normal sinus spontaneously.  Has a history of same.  Is due to see cardiology tomorrow.  Have discussed the pathophysiology and risk of paroxysmal atrial fibrillation.  Is also due for a lumbar MRI in 2 days.  History noted.    The following portions of the patient's history were reviewed and updated as appropriate: allergies, current medications, past family history, past medical history, past social history, past surgical history and problem list.    Review of Systems   Constitutional: Negative for activity change, appetite change, fatigue and unexpected weight change.   HENT: Negative for trouble swallowing and voice change.    Eyes: Negative for redness and visual disturbance.   Respiratory: Negative for cough and wheezing.    Cardiovascular: Negative for chest pain and palpitations.   Gastrointestinal: Negative for abdominal pain, constipation, diarrhea, nausea and vomiting.   Genitourinary: Negative for urgency.   Musculoskeletal: Positive for back pain. Negative for joint swelling.   Neurological: Negative for syncope and headaches.   Hematological: Negative for adenopathy.   Psychiatric/Behavioral: Negative for sleep disturbance.       Objective   Physical Exam   Constitutional: He appears well-developed.   HENT:   Head: Normocephalic.   Eyes: Pupils are equal, round, and reactive to light.   Neck: Normal range of motion.   Cardiovascular: Normal rate, regular rhythm and normal heart sounds.   Pulmonary/Chest: Effort normal.   Abdominal: Soft.       Assessment/Plan   Shayla was seen today for follow-up.    Diagnoses and all orders for this visit:    Paroxysmal atrial fibrillation (CMS/HCC)    Back pain of lumbar region with sciatica        on  above recheck as directed

## 2019-01-09 ENCOUNTER — OFFICE VISIT (OUTPATIENT)
Dept: CARDIOLOGY | Facility: CLINIC | Age: 59
End: 2019-01-09

## 2019-01-09 VITALS
OXYGEN SATURATION: 99 % | HEIGHT: 75 IN | BODY MASS INDEX: 25.86 KG/M2 | WEIGHT: 208 LBS | HEART RATE: 65 BPM | SYSTOLIC BLOOD PRESSURE: 140 MMHG | DIASTOLIC BLOOD PRESSURE: 74 MMHG

## 2019-01-09 DIAGNOSIS — E78.5 HYPERLIPIDEMIA, UNSPECIFIED HYPERLIPIDEMIA TYPE: Chronic | ICD-10-CM

## 2019-01-09 DIAGNOSIS — I10 ESSENTIAL HYPERTENSION: Primary | Chronic | ICD-10-CM

## 2019-01-09 DIAGNOSIS — I48.3 TYPICAL ATRIAL FLUTTER (HCC): ICD-10-CM

## 2019-01-09 DIAGNOSIS — I48.0 PAROXYSMAL ATRIAL FIBRILLATION (HCC): Chronic | ICD-10-CM

## 2019-01-09 DIAGNOSIS — I25.10 CORONARY ARTERY DISEASE INVOLVING NATIVE CORONARY ARTERY OF NATIVE HEART WITHOUT ANGINA PECTORIS: Chronic | ICD-10-CM

## 2019-01-09 PROCEDURE — 93000 ELECTROCARDIOGRAM COMPLETE: CPT | Performed by: INTERNAL MEDICINE

## 2019-01-09 PROCEDURE — 99214 OFFICE O/P EST MOD 30 MIN: CPT | Performed by: INTERNAL MEDICINE

## 2019-01-09 NOTE — PROGRESS NOTES
Shayla Kebede  58 y.o. male    01/09/2019  1. Essential hypertension    2. Hyperlipidemia, unspecified hyperlipidemia type    3. Paroxysmal atrial fibrillation (CMS/HCC)    4. Coronary artery disease involving native coronary artery of native heart without angina pectoris    5.      Atrial flutter    History of Present Illness:      58 years old patient who injured his back and treated with Excedrin and mild hematuria stopped that and also stop the Effient for about a day or 2 with a significant improvement and continued having a back pain present  to the ER noted atrial flutter with a controlled ventricular rate and scheduled to have MRI for the back.  Patient started on Percocet with improvement in pain with past medical history significant for hyperlipidemia, remote history paroxysmal atrial fibrillation  last echocardiographic study 2008, abnormal stress test leading to cardiac catheterization with PTCA stent of LAD and left circumflex system.  Patient is on appropriate medical management and during remarkably well.  He scheduled to the blood test done that included lipid profile through is a family doctor's office.  He denies orthopnea, PND, nauseous, vomiting.  Denies any polydipsia polyuria.  Denies any fever cough or chills.  Present dysuria hematuria.  Had history of chronic neck pain.Patient  Bit liberal on his diet intake time when the lipid profile done.    The patient unfortunately unable to take a statin every day due to significant muscle fatigue affecting his quality of life is taking atorvastatin 3-4 times per week.    8/10/18  Total Cholesterol 0 - 199 mg/dL 171    Triglycerides 20 - 199 mg/dL 84    HDL Cholesterol 60 - 200 mg/dL 48 Abnormally low     LDL Cholesterol  1 - 129 mg/dL 91    LDL/HDL Ratio 0.00 - 3.55 2.21        5/2018  Total Cholesterol 0 - 199 mg/dL 246     Triglycerides 20 - 199 mg/dL 197    HDL Cholesterol 60 - 200 mg/dL 52     LDL Cholesterol  0 - 129 mg/dL 155     VLDL  Cholesterol mg/dL 39.4    LDL/HDL Ratio 0.00 - 3.55 2.97       2017  · Left ventricular systolic function is normal. Estimated EF = 55%.  · Left ventricular diastolic dysfunction (grade I) consistent with impaired relaxation.  · Mild tricuspid valve regurgitation is present.     2008  · Left ventricular systolic function is normal. Estimated EF = 55%.  · Left ventricular diastolic dysfunction (grade I) consistent with impaired relaxation.  · Mild tricuspid valve regurgitation is present            SUBJECTIVE:    No Known Allergies      Past Medical History:   Diagnosis Date   • Acquired equinus deformity of foot    • Anxiety    • Anxiety state    • Coronary arteriosclerosis    • Foreign body in conjunctival sac     OD   • Ganglion cyst     Ganglion/synovial cyst - hand      • Hyperlipidemia    • Insomnia    • Knee pain    • Neck pain     right upper extremity radiculopathy   • Pain     plantar heel pain   • Pain in right arm    • Plantar fasciitis    • Presbyopia          Past Surgical History:   Procedure Laterality Date   • CARDIAC CATHETERIZATION  03/04/2015    Cardiac cath 52969 (2)    Critical lesion in the circumflex coronary artery, proximal/mid, and successful PCI w/balloon angioplasty.Noncritical disease in the RCA and LAD.Normal LV systolic function with Ef of 55% to 60%.    • COLONOSCOPY N/A 5/18/2017    Procedure: COLONOSCOPY;  Surgeon: Curly Mann DO;  Location: [facility] ENDOSCOPY;  Service:    • INJECTION OF MEDICATION  10/10/2014    Dexamethasone (1)      • INJECTION OF MEDICATION  10/10/2014    Kenalog (1)    • KNEE ACL RECONSTRUCTION     • KNEE ARTHROSCOPY      Knee arthroscopy, surgery (1)      • OTHER SURGICAL HISTORY  10/10/2014    Inj(s) Tend-Sheath, Ligament, Single 46699 (1)     • TOTAL KNEE ARTHROPLASTY      Total knee replacement (1)    done in the late to mid 90's          Family History   Problem Relation Age of Onset   • Aneurysm Mother    • Heart attack Father          Social History      Socioeconomic History   • Marital status:      Spouse name: Not on file   • Number of children: Not on file   • Years of education: Not on file   • Highest education level: Not on file   Social Needs   • Financial resource strain: Not on file   • Food insecurity - worry: Not on file   • Food insecurity - inability: Not on file   • Transportation needs - medical: Not on file   • Transportation needs - non-medical: Not on file   Occupational History   • Not on file   Tobacco Use   • Smoking status: Former Smoker   • Smokeless tobacco: Never Used   Substance and Sexual Activity   • Alcohol use: Yes     Frequency: Never     Comment: social   • Drug use: No   • Sexual activity: Defer   Other Topics Concern   • Not on file   Social History Narrative   • Not on file         Current Outpatient Medications   Medication Sig Dispense Refill   • clonazePAM (KlonoPIN) 0.5 MG tablet Take 1 tablet by mouth At Night As Needed (sleep). 30 tablet 3   • oxyCODONE-acetaminophen (PERCOCET) 5-325 MG per tablet Take 1 tablet by mouth Every 6 (Six) Hours As Needed for Moderate Pain . 12 tablet 0   • prasugrel (EFFIENT) 10 MG tablet TAKE ONE TABLET BY MOUTH DAILY 90 tablet 1   • sildenafil (VIAGRA) 100 MG tablet Take 1 tablet by mouth As Needed for erectile dysfunction. 0.5 to 1 as needed 1 hour before intercoarse 10 tablet 11     No current facility-administered medications for this visit.            Review of Systems:     Constitutional:  Denies recent weight loss, weight gain, fever or chills, no change in exercise tolerance.     HENT:  Denies any hearing loss, epistaxis, hoarseness, or difficulty speaking.     Eyes: Wears eyeglasses or contact lenses.    Respiratory:  Denies dyspnea with exertion,no cough, wheezing, or hemoptysis.     Cardiovascular: Negative for palpations, chest pain, orthopnea, PND, peripheral edema, syncope, or claudication.     Gastrointestinal:  Denies change in bowel habits, dyspepsia, ulcer disease,  "hematochezia, or melena.     Endocrine: Negative for cold intolerance, heat intolerance, polydipsia, polyphagia and polyuria. Denies any history of weight change, polydipsia, polyuria.     Genitourinary: Negative.      Musculoskeletal: Back.pain    Skin:  Denies any change in hair or nails, rashes, or skin lesions.     Allergic/Immunologic: Negative.  Negative for environmental allergies, food allergies and immunocompromised state.     Neurological:  Denies any history of recurrent headaches, strokes, TIA, or seizure disorder.     Hematological: Denies any food allergies, seasonal allergies, bleeding disorders, or lymphadenopathy.     Psychiatric/Behavioral: Denies any history of depression, substance abuse, or change in cognitive function.       OBJECTIVE:    /74   Pulse 65   Ht 190.5 cm (75\")   Wt 94.3 kg (208 lb)   SpO2 99%   BMI 26.00 kg/m²       Physical Exam:     Constitutional: Cooperative, alert and oriented, well-developed, well-nourished, in no acute distress.     HENT:   Head: Normocephalic, normal hair patterns, no masses or tenderness.  Ears, Nose, and Throat: No gross abnormalities. No pallor or cyanosis. Dentition good.   Eyes: EOMS intact, PERRL, conjunctivae and lids unremarkable. Fundoscopic exam and visual fields not performed.   Neck: No palpable masses or adenopathy, no thyromegaly, no JVD, carotid pulses are full and equal bilaterally and without  Bruits.     Cardiovascular: Regular rhythm, S1 and S2 normal, no S3 or S4. Apical impulse not displaced. No murmurs, gallops, or rubs detected.     Pulmonary/Chest: Chest: normal symmetry, no tenderness to palpation, normal respiratory excursion, no intercostal retraction, no use of accessory muscles. Pulmonary: Normal breath sounds. No rales or rhonchi.    Abdominal: Abdomen soft, bowel sounds normoactive, no masses, no hepatosplenomegaly, non-tender, no bruits.     Musculoskeletal: No deformities, clubbing, cyanosis, erythema, or edema " observed. There are no spinal abnormalities noted. Normal muscle strength and tone. Pulses full and equal in all extremities, no bruits auscultated.     Neurological: No gross motor or sensory deficits noted, affect appropriate, oriented to time, person, place.     Skin: Warm and dry to the touch, no apparent skin lesions or masses noted.     Psychiatric: He has a normal mood and affect. His behavior is normal. Judgment and thought content normal.           ECG 12 Lead  Date/Time: 1/9/2019 11:20 AM  Performed by: Amber Dee MD  Authorized by: Amber Dee MD   Comparison: not compared with previous ECG   Rhythm: sinus rhythm              Lab Results   Component Value Date    WBC 5.17 01/04/2019    HGB 15.8 01/04/2019    HCT 45.9 01/04/2019    MCV 89.1 01/04/2019     01/04/2019     Lab Results   Component Value Date    GLUCOSE 113 (H) 01/04/2019    BUN 14 01/04/2019    CREATININE 0.99 01/04/2019    EGFRIFNONA 77 01/04/2019    BCR 14.1 01/04/2019    CO2 30.0 01/04/2019    CALCIUM 9.3 01/04/2019    ALBUMIN 4.50 01/04/2019    AST 38 01/04/2019    ALT 26 01/04/2019     Lab Results   Component Value Date    CHOL 171 08/10/2018    CHOL 246 (H) 05/15/2018     Lab Results   Component Value Date    TRIG 84 08/10/2018    TRIG 197 05/15/2018    TRIG 150 07/10/2015     Lab Results   Component Value Date    HDL 48 (L) 08/10/2018    HDL 52 (L) 05/15/2018    HDL 53 (L) 07/10/2015     No components found for: LDLCALC  Lab Results   Component Value Date    LDL 91 08/10/2018     (H) 05/15/2018    LDL 95 10/31/2017     No results found for: HDLLDLRATIO  No components found for: CHOLHDL  No results found for: HGBA1C  Lab Results   Component Value Date    TSH 2.51 07/10/2015           ASSESSMENT AND PLAN:   CAD status post PTCA/ stent.  #2 paroxysmal atrial fibrillation no further recurrence More than 8 years ago that was secondary to EtOH and no further recurrence  #3 hyperlipidemia #4 paroxysmal atrial flutter  with counterclockwise mechanism             58 years old patient recently evaluated in the ER noted in atrial flutter spontaneously converted to sinus rhythm confirmed by the EKG in the office today.  Patient of significant back problem and waiting for MRI.  I will discuss the possibility of EP study and flutter ablation or continue present medication as her sinus rhythm.  Pros and cons of both option discussed.  He will consider ablation after knowing the status of his of back pain and is scheduled for MRI.  He is currently being treated with Effient and had mild hematuria when he was taking Excedrin resolved after discontinuations.  Patient doesn't want to consider oral anticoagulation at this stage with history of CAD status post PTCA stent of LAD and left circumflex system doing remarkably well.  Last echocardiographic study in 2008 and repeat echo in 2017 reported to have good heart function.  Will arrange another echocardiographic study given the history of paroxysmal atrial fibrillation and CAD.  He will continue atorvastatin for management of hyperlipidemia with  lipid profile, aspirin and Effient with History of CAD and PTCA stent of LAD and left circumflex system.  No further recurrence of chest pain           Shayla was seen today for follow-up.    Diagnoses and all orders for this visit:    Essential hypertension    Hyperlipidemia, unspecified hyperlipidemia type    Paroxysmal atrial fibrillation (CMS/HCC)    Coronary artery disease involving native coronary artery of native heart without angina pectoris        Amber Dee MD  1/9/2019  10:56 AM

## 2019-01-10 ENCOUNTER — HOSPITAL ENCOUNTER (OUTPATIENT)
Dept: MRI IMAGING | Facility: HOSPITAL | Age: 59
Discharge: HOME | End: 2019-01-10
Attending: ORTHOPAEDIC SURGERY | Admitting: ORTHOPAEDIC SURGERY

## 2019-01-10 DIAGNOSIS — M54.5 LOW BACK PAIN, UNSPECIFIED BACK PAIN LATERALITY, UNSPECIFIED CHRONICITY, WITH SCIATICA PRESENCE UNSPECIFIED: ICD-10-CM

## 2019-01-10 PROCEDURE — 72148 MRI LUMBAR SPINE W/O DYE: CPT

## 2019-01-21 ENCOUNTER — TELEPHONE (OUTPATIENT)
Dept: ORTHOPEDIC SURGERY | Facility: CLINIC | Age: 59
End: 2019-01-21

## 2019-01-21 NOTE — TELEPHONE ENCOUNTER
----- Message from Shy Garsia MA sent at 1/21/2019  8:31 AM CST -----      ----- Message -----  From: Shawn Latham MD  Sent: 1/20/2019   2:57 PM  To: Shy Garsia MA      This patient needs to be scheduled for a follow up appointment, thanks    ----- Message -----  From: [facility] Results [facility] In  Sent: 1/10/2019   5:13 PM  To: Shawn Latham MD

## 2019-01-24 ENCOUNTER — OFFICE VISIT (OUTPATIENT)
Dept: FAMILY MEDICINE CLINIC | Facility: CLINIC | Age: 59
End: 2019-01-24

## 2019-01-24 ENCOUNTER — APPOINTMENT (OUTPATIENT)
Dept: LAB | Facility: HOSPITAL | Age: 59
End: 2019-01-24

## 2019-01-24 VITALS
HEIGHT: 75 IN | DIASTOLIC BLOOD PRESSURE: 80 MMHG | SYSTOLIC BLOOD PRESSURE: 128 MMHG | BODY MASS INDEX: 25.86 KG/M2 | WEIGHT: 208 LBS

## 2019-01-24 DIAGNOSIS — M48.00 STENOSIS OF LATERAL RECESS OF MULTIPLE LEVELS OF SPINAL CANAL: Primary | Chronic | ICD-10-CM

## 2019-01-24 DIAGNOSIS — R68.82 DECREASED LIBIDO: ICD-10-CM

## 2019-01-24 DIAGNOSIS — I48.0 PAROXYSMAL ATRIAL FIBRILLATION (HCC): Chronic | ICD-10-CM

## 2019-01-24 PROBLEM — I48.3 TYPICAL ATRIAL FLUTTER: Chronic | Status: ACTIVE | Noted: 2019-01-09

## 2019-01-24 PROBLEM — M75.01 ADHESIVE CAPSULITIS OF RIGHT SHOULDER: Chronic | Status: ACTIVE | Noted: 2018-03-01

## 2019-01-24 PROBLEM — M25.511 RIGHT SHOULDER PAIN: Chronic | Status: ACTIVE | Noted: 2018-03-01

## 2019-01-24 PROBLEM — I48.3 TYPICAL ATRIAL FLUTTER (HCC): Chronic | Status: RESOLVED | Noted: 2019-01-09 | Resolved: 2019-01-24

## 2019-01-24 PROCEDURE — 84403 ASSAY OF TOTAL TESTOSTERONE: CPT | Performed by: FAMILY MEDICINE

## 2019-01-24 PROCEDURE — 36415 COLL VENOUS BLD VENIPUNCTURE: CPT | Performed by: FAMILY MEDICINE

## 2019-01-24 PROCEDURE — 99213 OFFICE O/P EST LOW 20 MIN: CPT | Performed by: FAMILY MEDICINE

## 2019-01-24 PROCEDURE — 84402 ASSAY OF FREE TESTOSTERONE: CPT | Performed by: FAMILY MEDICINE

## 2019-01-24 RX ORDER — ATORVASTATIN CALCIUM 20 MG/1
20 TABLET, FILM COATED ORAL EVERY OTHER DAY
COMMUNITY
End: 2019-06-17 | Stop reason: SDUPTHER

## 2019-01-24 NOTE — PROGRESS NOTES
Subjective   Shayla Kebede is a 58 y.o. male.     History of Present Illness   reevaluation MRI atrial fib flutter etc. MRI showed lateral recess stenosis.  States pain is greatly improved still there from time to time.  We have counseled deferring to orthopedics in regards to treatment options developed into a second opinion.  Atrial fib flutter per cardiology of counseled again patient on need for possible long-term monitoring and deferred back to cardiology for same.  Also states having decreased libido wishes testosterone level checked.   mainly on lifestyle measures.    The following portions of the patient's history were reviewed and updated as appropriate: allergies, current medications, past family history, past medical history, past social history, past surgical history and problem list.    Review of Systems   Constitutional: Negative for activity change, appetite change, fatigue and unexpected weight change.   HENT: Negative for trouble swallowing and voice change.    Eyes: Negative for redness and visual disturbance.   Respiratory: Negative for cough and wheezing.    Cardiovascular: Negative for chest pain and palpitations.   Gastrointestinal: Negative for abdominal pain, constipation, diarrhea, nausea and vomiting.   Genitourinary: Negative for urgency.   Musculoskeletal: Positive for back pain (Improved). Negative for joint swelling.   Neurological: Negative for syncope and headaches.   Hematological: Negative for adenopathy.   Psychiatric/Behavioral: Negative for sleep disturbance.       Objective   Physical Exam   Constitutional: He is oriented to person, place, and time. He appears well-developed.   HENT:   Head: Normocephalic.   Nose: Nose normal.   Eyes: Pupils are equal, round, and reactive to light.   Neck: Normal range of motion. No thyromegaly present.   Cardiovascular: Normal rate, regular rhythm, normal heart sounds and intact distal pulses. Exam reveals no gallop and no  friction rub.   No murmur heard.  Pulmonary/Chest: Breath sounds normal.   Abdominal: Soft. He exhibits no distension and no mass. There is no tenderness.   Musculoskeletal: Normal range of motion. He exhibits tenderness (Get up and go 3 seconds).   Neurological: He is alert and oriented to person, place, and time. He has normal reflexes.   Skin: Skin is warm and dry.   Psychiatric: He has a normal mood and affect. His behavior is normal. Thought content normal.       Assessment/Plan   Shayla was seen today for follow-up.    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

## 2019-01-26 LAB
TESTOST FREE SERPL-MCNC: 10.2 PG/ML (ref 7.2–24)
TESTOST SERPL-MCNC: 422 NG/DL (ref 264–916)

## 2019-02-06 ENCOUNTER — OFFICE VISIT (OUTPATIENT)
Dept: ORTHOPEDIC SURGERY | Facility: CLINIC | Age: 59
End: 2019-02-06

## 2019-02-06 VITALS — HEIGHT: 75 IN | WEIGHT: 208 LBS | BODY MASS INDEX: 25.86 KG/M2

## 2019-02-06 DIAGNOSIS — M48.00 STENOSIS OF LATERAL RECESS OF MULTIPLE LEVELS OF SPINAL CANAL: Primary | Chronic | ICD-10-CM

## 2019-02-06 DIAGNOSIS — M51.16 LUMBAR DISC HERNIATION WITH RADICULOPATHY: ICD-10-CM

## 2019-02-06 PROCEDURE — 99213 OFFICE O/P EST LOW 20 MIN: CPT | Performed by: ORTHOPAEDIC SURGERY

## 2019-02-06 NOTE — PROGRESS NOTES
"Shayla Kebede is a 58 y.o. male returns for     Chief Complaint   Patient presents with   • Lumbar Spine - Follow-up   • Results      01/10/19  MRI Lumbar Spine Without Contrast        HISTORY OF PRESENT ILLNESS:Mri results lumbar spine.  Pain scale today 3/10    58 y pain of the low back, the pain is located in the low back, present for 2 months, gradual, constant, ache, sharp, sitting aggravates the pain.  Pain does not radiate.  Symptoms are improved.  Pain is the worst in the low back.    Tramadol helps with pain.  No numbness no weakness.  No fevers or chills, sleep interruption is present.        CONCURRENT MEDICAL HISTORY:    The following portions of the patient's history were reviewed and updated as appropriate: allergies, current medications, past family history, past medical history, past social history, past surgical history and problem list.     ROS  No fevers or chills.  No chest pain or shortness of air.  No GI or  disturbances.    PHYSICAL EXAMINATION:       Ht 190.5 cm (75\")   Wt 94.3 kg (208 lb)   BMI 26.00 kg/m²     Physical Exam   Constitutional: He appears well-developed.   HENT:   Head: Normocephalic and atraumatic.   Eyes: Pupils are equal, round, and reactive to light. Right eye exhibits no discharge. Left eye exhibits no discharge.   Neck: Normal range of motion. No JVD present. No tracheal deviation present. No thyromegaly present.   Cardiovascular: Normal rate and intact distal pulses. Exam reveals no gallop and no friction rub.   No murmur heard.  Pulmonary/Chest: Effort normal and breath sounds normal. No respiratory distress. He has no wheezes. He has no rales. He exhibits no tenderness.   Abdominal: Soft. Bowel sounds are normal. He exhibits no distension. There is no tenderness. There is no guarding.   Musculoskeletal: He exhibits no edema, tenderness or deformity.   Lymphadenopathy:     He has no cervical adenopathy.   Neurological: He is alert. He has normal reflexes. He " displays normal reflexes. No cranial nerve deficit. Coordination normal.   Skin: Skin is warm. No rash noted. No erythema.   Psychiatric: He has a normal mood and affect. His behavior is normal. Thought content normal.       GAIT:     []  Normal  []  Antalgic    Assistive device: []  None  []  Walker     []  Crutches  []  Cane     []  Wheelchair  []  Stretcher    Right Ankle Exam     Muscle Strength   Dorsiflexion:  5/5  Plantar flexion:  5/5  Anterior tibial:  5/5  Posterior tibial:  5/5  Gastrocsoleus:  5/5  Peroneal muscle:  5/5      Left Ankle Exam     Muscle Strength   Dorsiflexion:  5/5   Plantar flexion:  5/5   Anterior tibial:  5/5   Posterior tibial:  5/5  Gastrocsoleus:  5/5  Peroneal muscle:  5/5      Right Hip Exam     Range of Motion   External rotation: normal   Internal rotation: normal     Muscle Strength   Abduction: 5/5   Flexion: 5/5       Left Hip Exam     Range of Motion   External rotation: normal   Internal rotation: normal     Muscle Strength   Abduction: 5/5   Flexion: 5/5       Back Exam     Tenderness   The patient is experiencing tenderness in the lumbar.    Range of Motion   Extension: 30   Flexion:  90 normal   Lateral bend right: 20   Lateral bend left: 20   Rotation right: 30   Rotation left: 30     Muscle Strength   Right Quadriceps:  5/5   Left Quadriceps:  5/5   Right Hamstrings:  5/5   Left Hamstrings:  5/5     Tests   Straight leg raise right: negative  Straight leg raise left: negative    Reflexes   Patellar: 2/4  Achilles: 2/4  Biceps: 2/4  Babinski's sign: normal     Other   Sensation: normal  Gait: normal   Erythema: no back redness  Scars: absent              Mri Lumbar Spine Without Contrast    Result Date: 1/10/2019  Narrative: PROCEDURE: MRI LUMBAR SPINE WO CONTRAST Clinical History: back pain, M54.5 Low back pain Indications: Low back pain Comparison: X-ray of the lumbar spine done on 12/26/2018 Technique: Noncontrast MRI of the lumbar spine was done in a multiplanar and  multi-sequence format. Findings: The thoracolumbar junction appears unremarkable. The conus medullaris terminates at T12/L1 level and appears unremarkable. There is no loss of vertebral body height. There is no evidence of bone marrow edema. There is no evidence of spondylolisthesis.  There is generalized desiccation of the intervertebral disks in the lumbar spine. At T12/L1 level there is no significant posterior disc bulge or protrusion, neural foraminal compromise or spinal canal stenosis. At L1/L2 level there is no significant posterior disc bulge or protrusion, neural foraminal compromise or spinal canal stenosis. At L2/L3 level there is no significant posterior disc bulge or protrusion, neural foraminal compromise or spinal canal stenosis. At L3/L4 level there is mild annular posterior disc bulge, without compromise of the neural foramina. Mild spinal canal stenosis is seen at this level. At L4/L5 level there is mild broad-based posterior disc bulge compromising the bilateral neural foramina. There is superimposed left neural foraminal recess disc protrusion at this level compromising the left-sided neural foramina and causing eccentric spinal canal stenosis. At L5/S1 level level there is a small left paracentral posterior disc bulge compromising the left-sided neural foramina . There is no significant spinal canal stenosis at this level. The paravertebral soft tissues are unremarkable. The visualized portions of the upper sacroiliac joints are unremarkable     Impression: Impression:  Multilevel degenerative change is seen at multiple levels, most pronounced at L4/L5 level, as described above. Location of Interpretation: Teleradiology. Electronically signed by:  Lalo Craig MD  1/10/2019 5:11 PM [facility] Workstation: [employer] reviewed MRI of lumbar spine, there is lumbar disc herniation of L4-5        ASSESSMENT:    Diagnoses and all orders for this visit:    Stenosis of lateral recess of multiple  levels of spinal canal    Lumbar disc herniation with radiculopathy          PLAN    Recommend lumbar discectomy, I discussed options with him.     Epidural steroid injection is an option.        Shawn Latham MD

## 2019-02-12 ENCOUNTER — TELEPHONE (OUTPATIENT)
Dept: FAMILY MEDICINE CLINIC | Facility: CLINIC | Age: 59
End: 2019-02-12

## 2019-02-12 DIAGNOSIS — M51.16 LUMBAR DISC HERNIATION WITH RADICULOPATHY: Primary | ICD-10-CM

## 2019-02-12 NOTE — TELEPHONE ENCOUNTER
PAULA ARTHUR HAS HAD MRI ALREADY ORDERED BY DR MILLER..HOWEVER..PAULA IS WANTING A SECOND OPINION AND HAS FOUND A NEUROSURGEON AT [facility] IN [place]..THAT HE NEEDS THE REF/NOTES AND MRI RESULTS FX TO..THEN AFTER GETTING ALL THIS INFOMATION..THAT DR WILL MAKE A DECISION ON IF HE WILL SEE PAULA  THE DR IS DR BAYLEE CUEVAS  FX#   KC=[phone]  ALSO UNTIL HE CAN GET IN TO SEE THIS DR.he NEEDS A REF TO DR PALOMO IN [facility]..THEY CAN SEE HIM NEXT WK IF REF CAN BE DONE  CALL PAULAEJANTONETTE/WIFE= [phone] OR   PAULA= [phone]=HE IS OUT OF TOWN RIGHT NOW

## 2019-03-21 ENCOUNTER — TELEPHONE (OUTPATIENT)
Dept: FAMILY MEDICINE CLINIC | Facility: CLINIC | Age: 59
End: 2019-03-21

## 2019-03-21 RX ORDER — TADALAFIL 10 MG/1
10 TABLET ORAL DAILY PRN
Qty: 10 TABLET | Refills: 5 | Status: SHIPPED | OUTPATIENT
Start: 2019-03-21 | End: 2019-06-17

## 2019-03-21 NOTE — TELEPHONE ENCOUNTER
Please call patient, he is wanting to change viagra to cialis. He is wondering if it needs to be approved, if he has to see Dr. Bernstein. Thanks!

## 2019-03-22 ENCOUNTER — DOCUMENTATION (OUTPATIENT)
Dept: CARDIOLOGY | Facility: CLINIC | Age: 59
End: 2019-03-22

## 2019-06-17 ENCOUNTER — OFFICE VISIT (OUTPATIENT)
Dept: FAMILY MEDICINE CLINIC | Facility: CLINIC | Age: 59
End: 2019-06-17

## 2019-06-17 VITALS
HEIGHT: 75 IN | WEIGHT: 208.9 LBS | BODY MASS INDEX: 25.98 KG/M2 | SYSTOLIC BLOOD PRESSURE: 120 MMHG | DIASTOLIC BLOOD PRESSURE: 80 MMHG

## 2019-06-17 DIAGNOSIS — F41.9 ANXIETY: Chronic | ICD-10-CM

## 2019-06-17 DIAGNOSIS — I48.0 PAROXYSMAL ATRIAL FIBRILLATION (HCC): Chronic | ICD-10-CM

## 2019-06-17 DIAGNOSIS — I10 ESSENTIAL HYPERTENSION: Primary | Chronic | ICD-10-CM

## 2019-06-17 DIAGNOSIS — I25.10 CORONARY ARTERY DISEASE INVOLVING NATIVE CORONARY ARTERY OF NATIVE HEART WITHOUT ANGINA PECTORIS: Chronic | ICD-10-CM

## 2019-06-17 PROCEDURE — 99214 OFFICE O/P EST MOD 30 MIN: CPT | Performed by: FAMILY MEDICINE

## 2019-06-17 RX ORDER — ATORVASTATIN CALCIUM 20 MG/1
20 TABLET, FILM COATED ORAL EVERY OTHER DAY
Qty: 45 TABLET | Refills: 3 | Status: SHIPPED | OUTPATIENT
Start: 2019-06-17 | End: 2020-01-08

## 2019-06-17 RX ORDER — CLONAZEPAM 0.5 MG/1
0.5 TABLET ORAL NIGHTLY PRN
Qty: 30 TABLET | Refills: 3 | Status: SHIPPED | OUTPATIENT
Start: 2019-06-17 | End: 2020-11-06 | Stop reason: SDUPTHER

## 2019-06-17 NOTE — PROGRESS NOTES
Subjective   Shayla Kebede is a 58 y.o. male.     History of Present Illness   coronary disease status post stent mild generalized anxiety history of chronic back pain improved.  Intrascapular weight down pressure down last lab work acceptable.  Needs refill on medication.  Is up-to-date on all other.  Defer shingles vaccine till later.  Overall feels stable.    The following portions of the patient's history were reviewed and updated as appropriate: allergies, current medications, past family history, past medical history, past social history, past surgical history and problem list.    Review of Systems   Constitutional: Negative for activity change, appetite change, fatigue and unexpected weight change.   HENT: Negative for trouble swallowing and voice change.    Eyes: Negative for redness and visual disturbance.   Respiratory: Negative for cough and wheezing.    Cardiovascular: Negative for chest pain and palpitations.   Gastrointestinal: Negative for abdominal pain, constipation, diarrhea, nausea and vomiting.   Genitourinary: Negative for urgency.   Musculoskeletal: Negative for joint swelling.   Neurological: Negative for syncope and headaches.   Hematological: Negative for adenopathy.   Psychiatric/Behavioral: Negative for sleep disturbance.       Objective   Physical Exam   Constitutional: He is oriented to person, place, and time. He appears well-developed.   HENT:   Head: Normocephalic.   Nose: Nose normal.   Eyes: Pupils are equal, round, and reactive to light.   Neck: Normal range of motion. No thyromegaly present.   Cardiovascular: Normal rate, regular rhythm, normal heart sounds and intact distal pulses. Exam reveals no gallop and no friction rub.   No murmur heard.  Pulmonary/Chest: Breath sounds normal.   Abdominal: Soft. He exhibits no distension and no mass. There is no tenderness.   Musculoskeletal: Normal range of motion.   Neurological: He is alert and oriented to person, place, and  time. He has normal reflexes.   Skin: Skin is warm and dry.   Psychiatric: He has a normal mood and affect.       Assessment/Plan   Shayla was seen today for follow-up.    Diagnoses and all orders for this visit:    Essential hypertension  -     Magnesium; Future  -     Comprehensive Metabolic Panel; Future    Paroxysmal atrial fibrillation (CMS/HCC)    Coronary artery disease involving native coronary artery of native heart without angina pectoris  -     atorvastatin (LIPITOR) 20 MG tablet; Take 1 tablet by mouth Every Other Day.  -     Lipid Panel With LDL / HDL Ratio; Future    Anxiety  -     clonazePAM (KlonoPIN) 0.5 MG tablet; Take 1 tablet by mouth At Night As Needed (sleep).        Counseled on summertime hydration infection prevention etc.  Continue medicines as outlined recheck 6 months be time for lab

## 2019-07-02 RX ORDER — PRASUGREL 10 MG/1
TABLET, FILM COATED ORAL
Qty: 90 TABLET | Refills: 1 | Status: SHIPPED | OUTPATIENT
Start: 2019-07-02 | End: 2020-06-01

## 2019-07-23 ENCOUNTER — OFFICE VISIT (OUTPATIENT)
Dept: CARDIOLOGY | Facility: CLINIC | Age: 59
End: 2019-07-23

## 2019-07-23 VITALS
DIASTOLIC BLOOD PRESSURE: 78 MMHG | HEART RATE: 56 BPM | BODY MASS INDEX: 26.24 KG/M2 | WEIGHT: 211 LBS | HEIGHT: 75 IN | SYSTOLIC BLOOD PRESSURE: 120 MMHG | OXYGEN SATURATION: 99 %

## 2019-07-23 DIAGNOSIS — I48.0 PAROXYSMAL ATRIAL FIBRILLATION (HCC): Chronic | ICD-10-CM

## 2019-07-23 DIAGNOSIS — E78.5 HYPERLIPIDEMIA, UNSPECIFIED HYPERLIPIDEMIA TYPE: Chronic | ICD-10-CM

## 2019-07-23 DIAGNOSIS — I25.10 CORONARY ARTERY DISEASE INVOLVING NATIVE CORONARY ARTERY OF NATIVE HEART WITHOUT ANGINA PECTORIS: Primary | Chronic | ICD-10-CM

## 2019-07-23 DIAGNOSIS — I10 ESSENTIAL HYPERTENSION: Chronic | ICD-10-CM

## 2019-07-23 PROCEDURE — 99213 OFFICE O/P EST LOW 20 MIN: CPT | Performed by: INTERNAL MEDICINE

## 2019-07-23 RX ORDER — ASPIRIN 81 MG/1
81 TABLET ORAL DAILY
Qty: 30 TABLET | Refills: 3 | Status: SHIPPED | OUTPATIENT
Start: 2019-07-23

## 2019-08-05 ENCOUNTER — TELEPHONE (OUTPATIENT)
Dept: FAMILY MEDICINE CLINIC | Facility: CLINIC | Age: 59
End: 2019-08-05

## 2019-08-05 DIAGNOSIS — N52.9 VASCULOGENIC ERECTILE DYSFUNCTION, UNSPECIFIED VASCULOGENIC ERECTILE DYSFUNCTION TYPE: ICD-10-CM

## 2019-08-05 RX ORDER — SILDENAFIL 100 MG/1
100 TABLET, FILM COATED ORAL AS NEEDED
Qty: 10 TABLET | Refills: 11 | Status: SHIPPED | OUTPATIENT
Start: 2019-08-05 | End: 2022-01-21 | Stop reason: SDUPTHER

## 2019-09-20 DIAGNOSIS — I48.0 PAROXYSMAL ATRIAL FIBRILLATION (HCC): Primary | ICD-10-CM

## 2019-09-20 NOTE — PROGRESS NOTES
Patient called to report palpitations. Patient has a history of a fib and is not on anti-coagulation other than effient, thus we will put him on EKG schedule for Monday. Order placed

## 2019-09-23 DIAGNOSIS — I48.0 PAROXYSMAL ATRIAL FIBRILLATION (HCC): Primary | ICD-10-CM

## 2019-10-16 ENCOUNTER — TELEPHONE (OUTPATIENT)
Dept: CARDIOLOGY | Facility: CLINIC | Age: 59
End: 2019-10-16

## 2019-10-16 NOTE — TELEPHONE ENCOUNTER
Patient has been notified.     ----- Message from April Penn RN sent at 10/16/2019  8:13 AM CDT -----  Holter ok. No evidence of a fib per akram

## 2019-11-13 ENCOUNTER — DOCUMENTATION (OUTPATIENT)
Dept: CARDIOLOGY | Facility: CLINIC | Age: 59
End: 2019-11-13

## 2019-12-23 ENCOUNTER — TELEPHONE (OUTPATIENT)
Dept: CARDIOLOGY | Facility: CLINIC | Age: 59
End: 2019-12-23

## 2019-12-23 NOTE — TELEPHONE ENCOUNTER
I called patient and informed him Dr. Dee was out of office for the next two weeks.  I will have to ask him about the medication when he returns.           ----- Message from Yolanda Lemon sent at 12/23/2019 10:12 AM CST -----  Regarding: meds  Contact: [phone]  prasugrel (EFFIENT) 10 MG tablett wants to know if Luiz can change his prasugrel (EFFIENT) 10 MG tablet to 5MG every day instead of 10 MG every other  He said if not just let him know    Thanks

## 2019-12-30 ENCOUNTER — TELEPHONE (OUTPATIENT)
Dept: FAMILY MEDICINE CLINIC | Facility: CLINIC | Age: 59
End: 2019-12-30

## 2019-12-30 DIAGNOSIS — Z12.5 SCREENING FOR MALIGNANT NEOPLASM OF PROSTATE: Primary | ICD-10-CM

## 2019-12-30 NOTE — TELEPHONE ENCOUNTER
Wants to know if you will put in the order for him to also have a PSA test while he is getting his other blood drawn.  Would like to get this done today or tomorrow. Wants to know if you will call him when this is in so he knows he can come on over and get it done.

## 2020-01-06 ENCOUNTER — LAB (OUTPATIENT)
Dept: LAB | Facility: HOSPITAL | Age: 60
End: 2020-01-06

## 2020-01-06 DIAGNOSIS — I25.10 CORONARY ARTERY DISEASE INVOLVING NATIVE CORONARY ARTERY OF NATIVE HEART WITHOUT ANGINA PECTORIS: Chronic | ICD-10-CM

## 2020-01-06 DIAGNOSIS — I10 ESSENTIAL HYPERTENSION: Chronic | ICD-10-CM

## 2020-01-06 DIAGNOSIS — Z12.5 SCREENING FOR MALIGNANT NEOPLASM OF PROSTATE: ICD-10-CM

## 2020-01-06 LAB
ALBUMIN SERPL-MCNC: 4.3 G/DL (ref 3.5–5.2)
ALBUMIN/GLOB SERPL: 1.4 G/DL
ALP SERPL-CCNC: 51 U/L (ref 39–117)
ALT SERPL W P-5'-P-CCNC: 18 U/L (ref 1–41)
ANION GAP SERPL CALCULATED.3IONS-SCNC: 11.8 MMOL/L (ref 5–15)
AST SERPL-CCNC: 27 U/L (ref 1–40)
BILIRUB SERPL-MCNC: 0.5 MG/DL (ref 0.2–1.2)
BUN BLD-MCNC: 11 MG/DL (ref 8–23)
BUN/CREAT SERPL: 11 (ref 7–25)
CALCIUM SPEC-SCNC: 9.3 MG/DL (ref 8.6–10.5)
CHLORIDE SERPL-SCNC: 101 MMOL/L (ref 98–107)
CHOLEST SERPL-MCNC: 213 MG/DL (ref 0–200)
CO2 SERPL-SCNC: 26.2 MMOL/L (ref 22–29)
CREAT BLD-MCNC: 1 MG/DL (ref 0.76–1.27)
GFR SERPL CREATININE-BSD FRML MDRD: 76 ML/MIN/1.73
GLOBULIN UR ELPH-MCNC: 3.1 GM/DL
GLUCOSE BLD-MCNC: 81 MG/DL (ref 65–99)
HDLC SERPL-MCNC: 51 MG/DL (ref 40–60)
LDLC SERPL CALC-MCNC: 139 MG/DL (ref 0–100)
LDLC/HDLC SERPL: 2.73 {RATIO}
MAGNESIUM SERPL-MCNC: 2.4 MG/DL (ref 1.6–2.4)
POTASSIUM BLD-SCNC: 4.2 MMOL/L (ref 3.5–5.2)
PROT SERPL-MCNC: 7.4 G/DL (ref 6–8.5)
PSA SERPL-MCNC: 1.05 NG/ML (ref 0–4)
SODIUM BLD-SCNC: 139 MMOL/L (ref 136–145)
TRIGL SERPL-MCNC: 114 MG/DL (ref 0–150)
VLDLC SERPL-MCNC: 22.8 MG/DL (ref 5–40)

## 2020-01-06 PROCEDURE — G0103 PSA SCREENING: HCPCS

## 2020-01-06 PROCEDURE — 36415 COLL VENOUS BLD VENIPUNCTURE: CPT

## 2020-01-06 PROCEDURE — 80061 LIPID PANEL: CPT

## 2020-01-06 PROCEDURE — 80053 COMPREHEN METABOLIC PANEL: CPT

## 2020-01-06 PROCEDURE — 83735 ASSAY OF MAGNESIUM: CPT

## 2020-01-08 ENCOUNTER — OFFICE VISIT (OUTPATIENT)
Dept: FAMILY MEDICINE CLINIC | Facility: CLINIC | Age: 60
End: 2020-01-08

## 2020-01-08 VITALS
SYSTOLIC BLOOD PRESSURE: 132 MMHG | HEIGHT: 75 IN | BODY MASS INDEX: 26.67 KG/M2 | DIASTOLIC BLOOD PRESSURE: 74 MMHG | WEIGHT: 214.5 LBS

## 2020-01-08 DIAGNOSIS — M54.2 CHRONIC NECK PAIN: Chronic | ICD-10-CM

## 2020-01-08 DIAGNOSIS — Z23 NEED FOR IMMUNIZATION AGAINST INFLUENZA: ICD-10-CM

## 2020-01-08 DIAGNOSIS — M51.16 LUMBAR DISC HERNIATION WITH RADICULOPATHY: Chronic | ICD-10-CM

## 2020-01-08 DIAGNOSIS — I10 ESSENTIAL HYPERTENSION: Chronic | ICD-10-CM

## 2020-01-08 DIAGNOSIS — G89.29 CHRONIC NECK PAIN: Chronic | ICD-10-CM

## 2020-01-08 DIAGNOSIS — I25.10 CORONARY ARTERY DISEASE INVOLVING NATIVE CORONARY ARTERY OF NATIVE HEART WITHOUT ANGINA PECTORIS: Primary | Chronic | ICD-10-CM

## 2020-01-08 DIAGNOSIS — E78.5 HYPERLIPIDEMIA, UNSPECIFIED HYPERLIPIDEMIA TYPE: Chronic | ICD-10-CM

## 2020-01-08 PROBLEM — M25.511 RIGHT SHOULDER PAIN: Chronic | Status: RESOLVED | Noted: 2018-03-01 | Resolved: 2020-01-08

## 2020-01-08 PROCEDURE — 90471 IMMUNIZATION ADMIN: CPT | Performed by: FAMILY MEDICINE

## 2020-01-08 PROCEDURE — 99214 OFFICE O/P EST MOD 30 MIN: CPT | Performed by: FAMILY MEDICINE

## 2020-01-08 PROCEDURE — 90674 CCIIV4 VAC NO PRSV 0.5 ML IM: CPT | Performed by: FAMILY MEDICINE

## 2020-01-08 RX ORDER — ATORVASTATIN CALCIUM 10 MG/1
10 TABLET, FILM COATED ORAL NIGHTLY
Qty: 90 TABLET | Refills: 3 | Status: SHIPPED | OUTPATIENT
Start: 2020-01-08 | End: 2020-07-10 | Stop reason: SDUPTHER

## 2020-01-08 NOTE — PROGRESS NOTES
Subjective   Shayla Kebede is a 59 y.o. male.     History of Present Illness   reevaluation mild hypertension diet-controlled history of hyperlipidemia history of coronary disease status post stent.  Also history of chronic neck and back pain.  In interim stop Lipitor due to subjective myalgia and 1 to see what numbers are like without.  LDL jumped up to 139.   need to taking cholesterol medicine for secondary not primary prevention of cardiovascular event.  Continues on antiplatelet therapy with no problem.  Has had a colonoscopy.  Back pain neck pain are somewhat improved doing home exercises yoga yvonne chi etc. type.  Other medicines have remained the same is behind on flu vaccine.  HPI    The following portions of the patient's history were reviewed and updated as appropriate: allergies, current medications, past family history, past medical history, past social history, past surgical history and problem list.    Review of Systems  Review of Systems   Constitutional: Negative for activity change, appetite change, fatigue and unexpected weight change.   HENT: Negative for trouble swallowing and voice change.    Eyes: Negative for redness and visual disturbance.   Respiratory: Negative for cough and wheezing.    Cardiovascular: Negative for chest pain and palpitations.   Gastrointestinal: Negative for abdominal pain, constipation, diarrhea, nausea and vomiting.   Genitourinary: Negative for urgency.   Musculoskeletal: Positive for arthralgias, back pain and myalgias. Negative for joint swelling.   Neurological: Negative for syncope and headaches.   Hematological: Negative for adenopathy.   Psychiatric/Behavioral: Negative for sleep disturbance.       Objective   Physical Exam  Physical Exam   Constitutional: He is oriented to person, place, and time. He appears well-developed.   HENT:   Head: Normocephalic.   Right Ear: External ear normal.   Nose: Nose normal.   Eyes: Pupils are equal, round, and  "reactive to light.   Neck: Normal range of motion. No thyromegaly present.   Cardiovascular: Normal rate, regular rhythm, normal heart sounds and intact distal pulses. Exam reveals no gallop and no friction rub.   No murmur heard.  Pulmonary/Chest: Breath sounds normal.   Abdominal: Soft. He exhibits no distension and no mass. There is no tenderness.   Musculoskeletal: Normal range of motion.   Get up and go 3 seconds gait normal 2+ pulses   Neurological: He is alert and oriented to person, place, and time. He has normal reflexes.   Skin: Skin is warm and dry.   Psychiatric: He has a normal mood and affect. His speech is normal and behavior is normal.         Visit Vitals  /74   Ht 190.5 cm (75\")   Wt 97.3 kg (214 lb 8 oz)   BMI 26.81 kg/m²     Body mass index is 26.81 kg/m².      Assessment/Plan   Shayla was seen today for follow-up.    Diagnoses and all orders for this visit:    Coronary artery disease involving native coronary artery of native heart without angina pectoris    Essential hypertension    Hyperlipidemia, unspecified hyperlipidemia type  -     atorvastatin (LIPITOR) 10 MG tablet; Take 1 tablet by mouth Every Night. For chol  -     Lipid Panel With LDL / HDL Ratio; Future    Need for immunization against influenza  -     Flucelvax Quad=>4Years (Vial)    Lumbar disc herniation with radiculopathy    Chronic neck pain    Restart cholesterol medicine at lower dose.  Repeat numbers in 4 weeks adjust accordingly upwards.  Counseled on the need for taking medication from secondary standpoint.  Counseled again on lifestyle measures mainly.  Update immunization.  Recheck again in 6 months  "

## 2020-02-04 ENCOUNTER — OFFICE VISIT (OUTPATIENT)
Dept: CARDIOLOGY | Facility: CLINIC | Age: 60
End: 2020-02-04

## 2020-02-04 VITALS
HEART RATE: 56 BPM | WEIGHT: 208 LBS | HEIGHT: 75 IN | SYSTOLIC BLOOD PRESSURE: 128 MMHG | BODY MASS INDEX: 25.86 KG/M2 | DIASTOLIC BLOOD PRESSURE: 78 MMHG

## 2020-02-04 DIAGNOSIS — I25.10 CORONARY ARTERY DISEASE INVOLVING NATIVE CORONARY ARTERY OF NATIVE HEART WITHOUT ANGINA PECTORIS: Chronic | ICD-10-CM

## 2020-02-04 DIAGNOSIS — I48.0 PAROXYSMAL ATRIAL FIBRILLATION (HCC): Chronic | ICD-10-CM

## 2020-02-04 DIAGNOSIS — E78.5 HYPERLIPIDEMIA, UNSPECIFIED HYPERLIPIDEMIA TYPE: Chronic | ICD-10-CM

## 2020-02-04 DIAGNOSIS — I48.0 PAROXYSMAL ATRIAL FIBRILLATION (HCC): Primary | Chronic | ICD-10-CM

## 2020-02-04 DIAGNOSIS — I10 ESSENTIAL HYPERTENSION: Primary | Chronic | ICD-10-CM

## 2020-02-04 PROCEDURE — 93000 ELECTROCARDIOGRAM COMPLETE: CPT | Performed by: INTERNAL MEDICINE

## 2020-02-04 PROCEDURE — 99214 OFFICE O/P EST MOD 30 MIN: CPT | Performed by: INTERNAL MEDICINE

## 2020-02-04 NOTE — PROGRESS NOTES
Shayla Kebede  59 y.o. male     2/4/2020     1. Essential hypertension    2. Hyperlipidemia, unspecified hyperlipidemia type    3. Paroxysmal atrial fibrillation (CMS/HCC)    4. Coronary artery disease involving native coronary artery of native heart without angina pectoris        History of Present Illness:    Body mass index is 26 kg/m². BMI is above normal parameters. Recommendations include: exercise counseling, nutrition counseling and referral to primary care.      59 years old patient  presented for routine follow-up having intermittent premature ventricular complex documented previously with associated symptom of fatigability lack of energy predominantly happening at nighttime with EKG finding discussed the patient sinus rhythm at 56 bpm with a normal interval with history of back problem, paroxysmal atrial fibrillation/atrial flutter past medical history significant for hyperlipidemia, remote history paroxysmal atrial fibrillation  last echocardiographic study 2008, abnormal stress test leading to cardiac catheterization with PTCA stent of LAD and left circumflex system.  Patient is on appropriate medical management and during remarkably well.  He denies orthopnea, PND, nauseous, vomiting.  Denies any polydipsia polyuria.  Denies any fever cough or chills.  Present dysuria hematuria.  Had history of chronic neck pain.Patient      The patient unfortunately unable to take a statin every day due to significant muscle fatigue affecting his quality of life is taking atorvastatin 3-4 times per week.     Holter 10/7/2019    Sinus rhythm with average heart rate of 60 minimum 45 and maximum 120 bpm without significant bradyarrhythmia or pauses     2 rare premature supraventricular ectopic beat with 4 beat runs of nonsustained atrial tachycardia     #3 rare isolated premature ventricular complex representing less than 1% total ventricular activity    1/6/2020  Total Cholesterol  0 - 200 mg/dL 213High      Triglycerides  0 - 150 mg/dL 114    HDL Cholesterol  40 - 60 mg/dL 51    LDL Cholesterol   0 - 100 mg/dL 139High     VLDL Cholesterol  5 - 40 mg/dL 22.8           8/10/18  Total Cholesterol 0 - 199 mg/dL 171    Triglycerides 20 - 199 mg/dL 84    HDL Cholesterol 60 - 200 mg/dL 48 Abnormally low     LDL Cholesterol  1 - 129 mg/dL 91    LDL/HDL Ratio 0.00 - 3.55 2.21          5/2018  Total Cholesterol 0 - 199 mg/dL 246     Triglycerides 20 - 199 mg/dL 197    HDL Cholesterol 60 - 200 mg/dL 52     LDL Cholesterol  0 - 129 mg/dL 155     VLDL Cholesterol mg/dL 39.4    LDL/HDL Ratio 0.00 - 3.55 2.97       2017  · Left ventricular systolic function is normal. Estimated EF = 55%.  · Left ventricular diastolic dysfunction (grade I) consistent with impaired relaxation.  · Mild tricuspid valve regurgitation is present.     2008  · Left ventricular systolic function is normal. Estimated EF = 55%.  · Left ventricular diastolic dysfunction (grade I) consistent with impaired relaxation.  · Mild tricuspid valve regurgitation is present               SUBJECTIVE:    No Known Allergies      Past Medical History:   Diagnosis Date   • Acquired equinus deformity of foot    • Anxiety    • Anxiety state    • Coronary arteriosclerosis    • Foreign body in conjunctival sac     OD   • Ganglion cyst     Ganglion/synovial cyst - hand      • Hyperlipidemia    • Insomnia    • Knee pain    • Neck pain     right upper extremity radiculopathy   • Pain     plantar heel pain   • Pain in right arm    • Plantar fasciitis    • Presbyopia          Past Surgical History:   Procedure Laterality Date   • CARDIAC CATHETERIZATION  03/04/2015    Cardiac cath 77191 (2)    Critical lesion in the circumflex coronary artery, proximal/mid, and successful PCI w/balloon angioplasty.Noncritical disease in the RCA and LAD.Normal LV systolic function with Ef of 55% to 60%.    • COLONOSCOPY N/A 5/18/2017    Procedure: COLONOSCOPY;  Surgeon: Curly Mann DO;   Location: [facility] ENDOSCOPY;  Service:    • INJECTION OF MEDICATION  10/10/2014    Dexamethasone (1)      • INJECTION OF MEDICATION  10/10/2014    Kenalog (1)    • KNEE ACL RECONSTRUCTION     • KNEE ARTHROSCOPY      Knee arthroscopy, surgery (1)      • OTHER SURGICAL HISTORY  10/10/2014    Inj(s) Tend-Sheath, Ligament, Single 39150 (1)     • TOTAL KNEE ARTHROPLASTY      Total knee replacement (1)    done in the late to mid 90's          Family History   Problem Relation Age of Onset   • Aneurysm Mother    • Heart attack Father          Social History     Socioeconomic History   • Marital status:      Spouse name: Not on file   • Number of children: Not on file   • Years of education: Not on file   • Highest education level: Not on file   Tobacco Use   • Smoking status: Former Smoker   • Smokeless tobacco: Never Used   Substance and Sexual Activity   • Alcohol use: Yes     Frequency: Never     Comment: social   • Drug use: No   • Sexual activity: Defer         Current Outpatient Medications   Medication Sig Dispense Refill   • aspirin 81 MG EC tablet Take 1 tablet by mouth Daily. 30 tablet 3   • atorvastatin (LIPITOR) 10 MG tablet Take 1 tablet by mouth Every Night. For chol 90 tablet 3   • clonazePAM (KlonoPIN) 0.5 MG tablet Take 1 tablet by mouth At Night As Needed (sleep). 30 tablet 3   • prasugrel (EFFIENT) 10 MG tablet TAKE ONE TABLET BY MOUTH DAILY 90 tablet 1   • sildenafil (VIAGRA) 100 MG tablet Take 1 tablet by mouth As Needed for erectile dysfunction. 0.5 to 1 as needed 1 hour before intercoarse 10 tablet 11     No current facility-administered medications for this visit.            Review of Systems:     Constitutional:  Denies recent weight loss, weight gain, fever or chills, no change in exercise tolerance.     HENT:  Denies any hearing loss, epistaxis, hoarseness, or difficulty speaking.     Eyes: No blurred    Respiratory:  Denies dyspnea with exertion,no cough, wheezing, or hemoptysis.  "    Cardiovascular: H&P    Gastrointestinal:  Denies change in bowel habits, dyspepsia, ulcer disease, hematochezia, or melena.     Endocrine: Negative for cold intolerance, heat intolerance, polydipsia, polyphagia and polyuria. Denies any history of weight change, polydipsia, polyuria.     Genitourinary: Negative.      Musculoskeletal: Denies any history of arthritic symptoms or back problems.     Skin:  Denies any change in hair or nails, rashes, or skin lesions.     Allergic/Immunologic: Negative.  Negative for environmental allergies, food allergies and immunocompromised state.     Neurological:  Denies any history of recurrent headaches, strokes, TIA, or seizure disorder.     Hematological: Denies any food allergies, seasonal allergies, bleeding disorders, or lymphadenopathy.     Psychiatric/Behavioral: Denies any history of depression, substance abuse, or change in cognitive function.       OBJECTIVE:    /78   Pulse 56   Ht 190.5 cm (75\")   Wt 94.3 kg (208 lb)   BMI 26.00 kg/m²       Physical Exam:     Constitutional: Cooperative, alert and oriented, well-developed, well-nourished, in no acute distress.     HENT:   Head: Normocephalic, normal hair patterns, no masses or tenderness.  Ears, Nose, and Throat: No gross abnormalities. No pallor or cyanosis. Dentition good.   Eyes: EOMS intact, PERRL, conjunctivae and lids unremarkable. Fundoscopic exam and visual fields not performed.   Neck: No palpable masses or adenopathy, no thyromegaly, no JVD, carotid pulses are full and equal bilaterally and without  Bruits.     Cardiovascular: Regular rhythm, S1 and S2 normal, no S3 or S4. Apical impulse not displaced. No murmurs, gallops, or rubs detected.     Pulmonary/Chest: Chest: normal symmetry, no tenderness to palpation, normal respiratory excursion, no intercostal retraction, no use of accessory muscles. Pulmonary: Normal breath sounds. No rales or rhonchi.    Abdominal: Abdomen soft, bowel sounds " normoactive, no masses, no hepatosplenomegaly, non-tender, no bruits.     Musculoskeletal: No deformities, clubbing, cyanosis, erythema, or edema observed. There are no spinal abnormalities noted. Normal muscle strength and tone. Pulses full and equal in all extremities, no bruits auscultated.     Neurological: No gross motor or sensory deficits noted, affect appropriate, oriented to time, person, place.     Skin: Warm and dry to the touch, no apparent skin lesions or masses noted.     Psychiatric: He has a normal mood and affect. His behavior is normal. Judgment and thought content normal.         Procedures      Lab Results   Component Value Date    WBC 5.17 01/04/2019    HGB 15.8 01/04/2019    HCT 45.9 01/04/2019    MCV 89.1 01/04/2019     01/04/2019     Lab Results   Component Value Date    GLUCOSE 81 01/06/2020    BUN 11 01/06/2020    CREATININE 1.00 01/06/2020    EGFRIFNONA 76 01/06/2020    BCR 11.0 01/06/2020    CO2 26.2 01/06/2020    CALCIUM 9.3 01/06/2020    ALBUMIN 4.30 01/06/2020    AST 27 01/06/2020    ALT 18 01/06/2020     Lab Results   Component Value Date    CHOL 213 (H) 01/06/2020    CHOL 171 08/10/2018    CHOL 246 (H) 05/15/2018     Lab Results   Component Value Date    TRIG 114 01/06/2020    TRIG 84 08/10/2018    TRIG 197 05/15/2018     Lab Results   Component Value Date    HDL 51 01/06/2020    HDL 48 (L) 08/10/2018    HDL 52 (L) 05/15/2018     No components found for: LDLCALC  Lab Results   Component Value Date     (H) 01/06/2020    LDL 91 08/10/2018     (H) 05/15/2018     No results found for: HDLLDLRATIO  No components found for: CHOLHDL  No results found for: HGBA1C  Lab Results   Component Value Date    TSH 2.51 07/10/2015           ASSESSMENT AND PLAN:   CAD status post PTCA/ stent  .  #2 paroxysmal atrial fibrillation/atrial flutter no further recurrence More than 8 years ago that was secondary to EtOH and no further recurrence    #3 hyperlipidemia   #PVCs           58 years old patient  recently evaluated in ER for atrial flutter spontaneously converted.  His main problem is premature ventricular complex for which we will start low-dose beta-blocker such as Lopressor 12.5 mg at evening time    Previously, discuss the possibility of EP study and flutter ablation or continue present medication given sinus rhythm.    history of hematuria resolved after discontinuation's of Excedrin.   Refused oral anticoagulation with a background history of with history of CAD status post PTCA stent of LAD and left circumflex system doing remarkably well.    He will continue atorvastatin for management of hyperlipidemia with  lipid profile, aspirin and Effient with History of CAD and PTCA stent of LAD and left circumflex system.  No further recurrence of chest pain           Shayla was seen today for follow-up.    Diagnoses and all orders for this visit:    Essential hypertension    Hyperlipidemia, unspecified hyperlipidemia type    Paroxysmal atrial fibrillation (CMS/HCC)    Coronary artery disease involving native coronary artery of native heart without angina pectoris        Amber Dee MD  2/4/2020  2:57 PM

## 2020-04-29 ENCOUNTER — DOCUMENTATION (OUTPATIENT)
Dept: CARDIOLOGY | Facility: CLINIC | Age: 60
End: 2020-04-29

## 2020-04-29 NOTE — PROGRESS NOTES
"Pt called complaining of increased \"PVCs\" ongoing for past 2 days. PT states he recently had second opinion with Dr Sierra Baumann who did 48 hour monitor but has not received results yet. PT admits he has not been taking Metoprolol for several weeks. PT recently started taking ASA 81mg and Lipitor again. PT states HR is 56. /95. Denies chest pain. Does not wish to go to ER. Consulted Dr Dee who instructed pt to take Metoprolol at bed time. Reduce stress, caffeine. PT was given appt for phone visit on Friday, 5/1. PT instructed to seek ER care for chest pain. PT verbalizes understanding.   "

## 2020-04-30 ENCOUNTER — TELEPHONE (OUTPATIENT)
Dept: CARDIOLOGY | Facility: CLINIC | Age: 60
End: 2020-04-30

## 2020-04-30 NOTE — TELEPHONE ENCOUNTER
Talked to pt about visit tmw. He's oing to email me some portable EKGS that he has ran    ----- Message from Blanca Grant sent at 2020  3:11 PM CDT -----  Regarding: call back  Contact: [phone]  Shayla Kebede gabino 58 wanted a call back @ [phone] to talk about his appt for tomorrow. thxs

## 2020-05-01 ENCOUNTER — OFFICE VISIT (OUTPATIENT)
Dept: CARDIOLOGY | Facility: CLINIC | Age: 60
End: 2020-05-01

## 2020-05-01 VITALS
BODY MASS INDEX: 26.11 KG/M2 | HEIGHT: 75 IN | HEART RATE: 55 BPM | DIASTOLIC BLOOD PRESSURE: 90 MMHG | WEIGHT: 210 LBS | SYSTOLIC BLOOD PRESSURE: 158 MMHG

## 2020-05-01 DIAGNOSIS — I48.0 PAROXYSMAL ATRIAL FIBRILLATION (HCC): Chronic | ICD-10-CM

## 2020-05-01 DIAGNOSIS — E78.5 HYPERLIPIDEMIA, UNSPECIFIED HYPERLIPIDEMIA TYPE: Chronic | ICD-10-CM

## 2020-05-01 DIAGNOSIS — I10 ESSENTIAL HYPERTENSION: Primary | Chronic | ICD-10-CM

## 2020-05-01 DIAGNOSIS — I25.10 CORONARY ARTERY DISEASE INVOLVING NATIVE CORONARY ARTERY OF NATIVE HEART WITHOUT ANGINA PECTORIS: Chronic | ICD-10-CM

## 2020-05-01 PROBLEM — Z15.89 MONOALLELIC MUTATION OF PACS1 GENE: Status: ACTIVE | Noted: 2020-05-01

## 2020-05-01 PROCEDURE — 99443 PR PHYS/QHP TELEPHONE EVALUATION 21-30 MIN: CPT | Performed by: INTERNAL MEDICINE

## 2020-05-01 NOTE — PROGRESS NOTES
Shayla Kebede  59 y.o. male     5/1/2020     1. Essential hypertension    2. Hyperlipidemia, unspecified hyperlipidemia type    3. Paroxysmal atrial fibrillation (CMS/HCC)    4. Coronary artery disease involving native coronary artery of native heart without angina pectoris        History of Present Illness:    This visit has been rescheduled as a phone visit to comply with patient safety concerns in accordance with CDC recommendations. Total time of discussion was 25 minutes.    You have chosen to receive care through a telephone visit. Do you consent to use a telephone visit for your medical care today? Yes      59 years old patient presented for telephone office visit and main problem is of palpitation he sent smart watch strip to the office and was sinus rhythm with the PACs no PVCs noted or atrial fibrillation is appreciated with the previous history of intermittent premature ventricular complex .history of back problem, paroxysmal atrial fibrillation/atrial flutter past medical history significant for hyperlipidemia, remote history paroxysmal atrial fibrillation  last echocardiographic study 2008, abnormal stress test leading to cardiac catheterization with PTCA stent of LAD and left circumflex system.  Patient is on appropriate medical management and during remarkably well.  He denies orthopnea, PND, nauseous, vomiting.  Denies any polydipsia polyuria.  Denies any fever cough or chills.  Present dysuria hematuria.  Had history of chronic neck pain.Patient      The patient unfortunately unable to take a statin every day due to significant muscle fatigue affecting his quality of life is taking atorvastatin 3-4 times per week.     Holter 10/7/2019    Sinus rhythm with average heart rate of 60 minimum 45 and maximum 120 bpm without significant bradyarrhythmia or pauses     2 rare premature supraventricular ectopic beat with 4 beat runs of nonsustained atrial tachycardia     #3 rare isolated premature  ventricular complex representing less than 1% total ventricular activity    1/6/2020  Total Cholesterol  0 - 200 mg/dL 213High     Triglycerides  0 - 150 mg/dL 114    HDL Cholesterol  40 - 60 mg/dL 51    LDL Cholesterol   0 - 100 mg/dL 139High     VLDL Cholesterol  5 - 40 mg/dL 22.8           8/10/18  Total Cholesterol 0 - 199 mg/dL 171    Triglycerides 20 - 199 mg/dL 84    HDL Cholesterol 60 - 200 mg/dL 48 Abnormally low     LDL Cholesterol  1 - 129 mg/dL 91    LDL/HDL Ratio 0.00 - 3.55 2.21          5/2018  Total Cholesterol 0 - 199 mg/dL 246     Triglycerides 20 - 199 mg/dL 197    HDL Cholesterol 60 - 200 mg/dL 52     LDL Cholesterol  0 - 129 mg/dL 155     VLDL Cholesterol mg/dL 39.4    LDL/HDL Ratio 0.00 - 3.55 2.97       2017  · Left ventricular systolic function is normal. Estimated EF = 55%.  · Left ventricular diastolic dysfunction (grade I) consistent with impaired relaxation.  · Mild tricuspid valve regurgitation is present.     2008  · Left ventricular systolic function is normal. Estimated EF = 55%.  · Left ventricular diastolic dysfunction (grade I) consistent with impaired relaxation.  · Mild tricuspid valve regurgitation is present               SUBJECTIVE:    No Known Allergies      Past Medical History:   Diagnosis Date   • Acquired equinus deformity of foot    • Anxiety    • Anxiety state    • Coronary arteriosclerosis    • Foreign body in conjunctival sac     OD   • Ganglion cyst     Ganglion/synovial cyst - hand      • Hyperlipidemia    • Insomnia    • Knee pain    • Neck pain     right upper extremity radiculopathy   • Pain     plantar heel pain   • Pain in right arm    • Plantar fasciitis    • Presbyopia          Past Surgical History:   Procedure Laterality Date   • CARDIAC CATHETERIZATION  03/04/2015    Cardiac cath 98950 (2)    Critical lesion in the circumflex coronary artery, proximal/mid, and successful PCI w/balloon angioplasty.Noncritical disease in the RCA and LAD.Normal LV systolic  function with Ef of 55% to 60%.    • COLONOSCOPY N/A 5/18/2017    Procedure: COLONOSCOPY;  Surgeon: Curly Mann DO;  Location: [facility] ENDOSCOPY;  Service:    • INJECTION OF MEDICATION  10/10/2014    Dexamethasone (1)      • INJECTION OF MEDICATION  10/10/2014    Kenalog (1)    • KNEE ACL RECONSTRUCTION     • KNEE ARTHROSCOPY      Knee arthroscopy, surgery (1)      • OTHER SURGICAL HISTORY  10/10/2014    Inj(s) Tend-Sheath, Ligament, Single 67363 (1)     • TOTAL KNEE ARTHROPLASTY      Total knee replacement (1)    done in the late to mid 90's          Family History   Problem Relation Age of Onset   • Aneurysm Mother    • Heart attack Father          Social History     Socioeconomic History   • Marital status:      Spouse name: Not on file   • Number of children: Not on file   • Years of education: Not on file   • Highest education level: Not on file   Tobacco Use   • Smoking status: Former Smoker   • Smokeless tobacco: Never Used   Substance and Sexual Activity   • Alcohol use: Yes     Frequency: Never     Comment: social   • Drug use: No   • Sexual activity: Defer         Current Outpatient Medications   Medication Sig Dispense Refill   • aspirin 81 MG EC tablet Take 1 tablet by mouth Daily. 30 tablet 3   • atorvastatin (LIPITOR) 10 MG tablet Take 1 tablet by mouth Every Night. For chol 90 tablet 3   • clonazePAM (KlonoPIN) 0.5 MG tablet Take 1 tablet by mouth At Night As Needed (sleep). 30 tablet 3   • metoprolol tartrate (LOPRESSOR) 25 MG tablet Take 0.5 tablets by mouth Every Night. 30 tablet 5   • prasugrel (EFFIENT) 10 MG tablet TAKE ONE TABLET BY MOUTH DAILY 90 tablet 1   • sildenafil (VIAGRA) 100 MG tablet Take 1 tablet by mouth As Needed for erectile dysfunction. 0.5 to 1 as needed 1 hour before intercoarse 10 tablet 11     No current facility-administered medications for this visit.            Review of Systems:     Constitutional:  Denies recent weight loss, weight gain, fever or chills, no  change in exercise tolerance.     HENT:  Denies any hearing loss, epistaxis, hoarseness, or difficulty speaking.     Eyes: No blurred    Respiratory:  Denies dyspnea with exertion,no cough, wheezing, or hemoptysis.     Cardiovascular: H&P    Gastrointestinal:  Denies change in bowel habits, dyspepsia, ulcer disease, hematochezia, or melena.     Endocrine: Negative for cold intolerance, heat intolerance, polydipsia, polyphagia and polyuria. Denies any history of weight change, polydipsia, polyuria.     Genitourinary: Negative.      Musculoskeletal: Denies any history of arthritic symptoms or back problems.     Skin:  Denies any change in hair or nails, rashes, or skin lesions.     Allergic/Immunologic: Negative.  Negative for environmental allergies, food allergies and immunocompromised state.     Neurological:  Denies any history of recurrent headaches, strokes, TIA, or seizure disorder.     Hematological: Denies any food allergies, seasonal allergies, bleeding disorders, or lymphadenopathy.     Psychiatric/Behavioral: Denies any history of depression, substance abuse, or change in cognitive function.       OBJECTIVE:    There were no vitals taken for this visit.      Physical Exam:     No physical exam due to telephone office visit        Procedures      Lab Results   Component Value Date    WBC 5.17 01/04/2019    HGB 15.8 01/04/2019    HCT 45.9 01/04/2019    MCV 89.1 01/04/2019     01/04/2019     Lab Results   Component Value Date    GLUCOSE 81 01/06/2020    BUN 11 01/06/2020    CREATININE 1.00 01/06/2020    EGFRIFNONA 76 01/06/2020    BCR 11.0 01/06/2020    CO2 26.2 01/06/2020    CALCIUM 9.3 01/06/2020    ALBUMIN 4.30 01/06/2020    AST 27 01/06/2020    ALT 18 01/06/2020     Lab Results   Component Value Date    CHOL 213 (H) 01/06/2020    CHOL 171 08/10/2018    CHOL 246 (H) 05/15/2018     Lab Results   Component Value Date    TRIG 114 01/06/2020    TRIG 84 08/10/2018    TRIG 197 05/15/2018     Lab Results    Component Value Date    HDL 51 01/06/2020    HDL 48 (L) 08/10/2018    HDL 52 (L) 05/15/2018     No components found for: LDLCALC  Lab Results   Component Value Date     (H) 01/06/2020    LDL 91 08/10/2018     (H) 05/15/2018     No results found for: HDLLDLRATIO  No components found for: CHOLHDL  No results found for: HGBA1C  Lab Results   Component Value Date    TSH 2.51 07/10/2015           ASSESSMENT AND PLAN:   CAD status post PTCA/ stent  .  #2 paroxysmal atrial fibrillation/atrial flutter no further recurrence More than 8 years ago that was secondary to EtOH     #3 hyperlipidemia   #PVCs  Premature atrial complex          59 years old patient  presented for telephone office visit with a previous history of atrial flutter spontaneously converted no feeling palpitation signed a rhythm strip from smart watch consistent with sinus rhythm with PACs and nonsustained atrial tachycardia of 3 beat.  Patient had metoprolol but not taking as the patient take half tablet twice a day and risk factor lifestyle modification discussed.   Previously, discuss the possibility of EP study and flutter ablation or continue present medication given sinus rhythm.    Refused oral anticoagulation with a background history of with history of CAD status post PTCA stent of LAD and left circumflex system doing remarkably well.    He will continue atorvastatin for management of hyperlipidemia with  lipid profile, aspirin and Effient with History of CAD and PTCA stent of LAD and left circumflex system.  No further recurrence of chest pain           Diagnoses and all orders for this visit:    Essential hypertension    Hyperlipidemia, unspecified hyperlipidemia type    Paroxysmal atrial fibrillation (CMS/HCC)    Coronary artery disease involving native coronary artery of native heart without angina pectoris        Amber Dee MD  5/1/2020  10:47

## 2020-05-21 ENCOUNTER — TRANSCRIBE ORDERS (OUTPATIENT)
Dept: LAB | Facility: HOSPITAL | Age: 60
End: 2020-05-21

## 2020-05-21 DIAGNOSIS — I49.3 VENTRICULAR PREMATURE BEATS: Primary | ICD-10-CM

## 2020-05-22 ENCOUNTER — APPOINTMENT (OUTPATIENT)
Dept: LAB | Facility: HOSPITAL | Age: 60
End: 2020-05-22

## 2020-05-22 LAB
ANION GAP SERPL CALCULATED.3IONS-SCNC: 14.3 MMOL/L (ref 5–15)
BASOPHILS # BLD AUTO: 0.05 10*3/MM3 (ref 0–0.2)
BASOPHILS NFR BLD AUTO: 0.8 % (ref 0–1.5)
BUN BLD-MCNC: 13 MG/DL (ref 8–23)
BUN/CREAT SERPL: 12.1 (ref 7–25)
CALCIUM SPEC-SCNC: 9.8 MG/DL (ref 8.6–10.5)
CHLORIDE SERPL-SCNC: 101 MMOL/L (ref 98–107)
CO2 SERPL-SCNC: 23.7 MMOL/L (ref 22–29)
CREAT BLD-MCNC: 1.07 MG/DL (ref 0.76–1.27)
DEPRECATED RDW RBC AUTO: 42.6 FL (ref 37–54)
EOSINOPHIL # BLD AUTO: 0.43 10*3/MM3 (ref 0–0.4)
EOSINOPHIL NFR BLD AUTO: 6.5 % (ref 0.3–6.2)
ERYTHROCYTE [DISTWIDTH] IN BLOOD BY AUTOMATED COUNT: 13.1 % (ref 12.3–15.4)
GFR SERPL CREATININE-BSD FRML MDRD: 70 ML/MIN/1.73
GLUCOSE BLD-MCNC: 115 MG/DL (ref 65–99)
HCT VFR BLD AUTO: 44.7 % (ref 37.5–51)
HGB BLD-MCNC: 15.6 G/DL (ref 13–17.7)
IMM GRANULOCYTES # BLD AUTO: 0.03 10*3/MM3 (ref 0–0.05)
IMM GRANULOCYTES NFR BLD AUTO: 0.5 % (ref 0–0.5)
LYMPHOCYTES # BLD AUTO: 1.99 10*3/MM3 (ref 0.7–3.1)
LYMPHOCYTES NFR BLD AUTO: 30 % (ref 19.6–45.3)
MCH RBC QN AUTO: 31.1 PG (ref 26.6–33)
MCHC RBC AUTO-ENTMCNC: 34.9 G/DL (ref 31.5–35.7)
MCV RBC AUTO: 89.2 FL (ref 79–97)
MONOCYTES # BLD AUTO: 0.48 10*3/MM3 (ref 0.1–0.9)
MONOCYTES NFR BLD AUTO: 7.2 % (ref 5–12)
NEUTROPHILS # BLD AUTO: 3.66 10*3/MM3 (ref 1.7–7)
NEUTROPHILS NFR BLD AUTO: 55 % (ref 42.7–76)
NRBC BLD AUTO-RTO: 0 /100 WBC (ref 0–0.2)
PLATELET # BLD AUTO: 243 10*3/MM3 (ref 140–450)
PMV BLD AUTO: 11.8 FL (ref 6–12)
POTASSIUM BLD-SCNC: 4.6 MMOL/L (ref 3.5–5.2)
RBC # BLD AUTO: 5.01 10*6/MM3 (ref 4.14–5.8)
SODIUM BLD-SCNC: 139 MMOL/L (ref 136–145)
WBC NRBC COR # BLD: 6.64 10*3/MM3 (ref 3.4–10.8)

## 2020-05-22 PROCEDURE — 36415 COLL VENOUS BLD VENIPUNCTURE: CPT | Performed by: INTERNAL MEDICINE

## 2020-05-22 PROCEDURE — 85025 COMPLETE CBC W/AUTO DIFF WBC: CPT | Performed by: INTERNAL MEDICINE

## 2020-05-22 PROCEDURE — 80048 BASIC METABOLIC PNL TOTAL CA: CPT | Performed by: INTERNAL MEDICINE

## 2020-06-01 ENCOUNTER — OFFICE VISIT (OUTPATIENT)
Dept: FAMILY MEDICINE CLINIC | Facility: CLINIC | Age: 60
End: 2020-06-01

## 2020-06-01 VITALS
WEIGHT: 214 LBS | SYSTOLIC BLOOD PRESSURE: 136 MMHG | HEIGHT: 75 IN | DIASTOLIC BLOOD PRESSURE: 82 MMHG | BODY MASS INDEX: 26.61 KG/M2

## 2020-06-01 DIAGNOSIS — K62.89 RECTAL PAIN: ICD-10-CM

## 2020-06-01 DIAGNOSIS — R10.31 RIGHT LOWER QUADRANT ABDOMINAL PAIN: Primary | ICD-10-CM

## 2020-06-01 PROCEDURE — 99214 OFFICE O/P EST MOD 30 MIN: CPT | Performed by: FAMILY MEDICINE

## 2020-06-01 NOTE — PROGRESS NOTES
Subjective   Shayla Kebede is a 59 y.o. male.  Requesting evaluation 3-week history intermittent right lower quadrant abdominal pain radiating to the groin and rectum.  Comes and goes not really made worse by food urination or or intra-abdominal processes.  Sometimes tender to touch sometimes not.  No fever no rash.  Last colonoscopy 3 years ago was reported as negative.  No abdominal surgeries in the past.  Continues on aspirin daily.  History noted.    History of Present Illness   HPI    The following portions of the patient's history were reviewed and updated as appropriate: allergies, current medications, past family history, past medical history, past social history, past surgical history and problem list.    Review of Systems  Review of Systems   Constitutional: Negative for activity change, appetite change, fatigue and unexpected weight change.   HENT: Negative for trouble swallowing and voice change.    Eyes: Negative for redness and visual disturbance.   Respiratory: Negative for cough and wheezing.    Cardiovascular: Negative for chest pain and palpitations.   Gastrointestinal: Positive for abdominal pain and rectal pain. Negative for constipation, diarrhea, nausea and vomiting.   Genitourinary: Negative for urgency.   Musculoskeletal: Negative for joint swelling.   Neurological: Negative for syncope and headaches.   Hematological: Negative for adenopathy.   Psychiatric/Behavioral: Negative for sleep disturbance.       Objective   Physical Exam  Physical Exam   Constitutional: He is oriented to person, place, and time. He appears well-developed.   HENT:   Head: Normocephalic.   Nose: Nose normal.   Eyes: Pupils are equal, round, and reactive to light.   Neck: Normal range of motion. No thyromegaly present.   Cardiovascular: Normal rate, regular rhythm, normal heart sounds and intact distal pulses. Exam reveals no gallop and no friction rub.   No murmur heard.  Pulmonary/Chest: Breath sounds normal.  "  Abdominal: Soft. He exhibits no distension and no mass. There is no tenderness. There is no rebound and no guarding. No hernia.   No hernia   Musculoskeletal: Normal range of motion.   Get up and go 3 to 5 seconds gait normal   Neurological: He is alert and oriented to person, place, and time. He has normal reflexes.   Skin: Skin is warm and dry.   Psychiatric: He has a normal mood and affect. His speech is normal and behavior is normal.   No distress         Visit Vitals  /82   Ht 190.5 cm (75\")   Wt 97.1 kg (214 lb)   BMI 26.75 kg/m²     Body mass index is 26.75 kg/m².      Assessment/Plan   Shayla was seen today for abdominal pain.    Diagnoses and all orders for this visit:    Right lower quadrant abdominal pain  -     CT Abdomen Pelvis Without Contrast; Future    Rectal pain  -     CT Abdomen Pelvis Without Contrast; Future    Suspect probably either mechanical and/or ilioinguinal radiculopathy type pain and entrapment.  Does do a lot of yoga for back pain.  Given the length of time of symptoms and location will get a CT to rule out appendix versus renal stone obstruction etc.  If this is negative then would treat conservatively as outlined.  Rechecks directed  "

## 2020-06-02 ENCOUNTER — TELEPHONE (OUTPATIENT)
Dept: FAMILY MEDICINE CLINIC | Facility: CLINIC | Age: 60
End: 2020-06-02

## 2020-06-02 NOTE — TELEPHONE ENCOUNTER
PT's wife, Wang, called in reference to attached message. States she hasn't heard anything back and is unsure why there's a delay.     Please advise/call Wang back ASAP: [phone]

## 2020-06-02 NOTE — TELEPHONE ENCOUNTER
Pt is wanting to have his CT this AM and needs the order faxed to [facility] in [place]. Thanks!

## 2020-06-05 DIAGNOSIS — K62.89 RECTAL PAIN: ICD-10-CM

## 2020-06-05 DIAGNOSIS — R10.31 RIGHT LOWER QUADRANT ABDOMINAL PAIN: ICD-10-CM

## 2020-07-06 DIAGNOSIS — E78.5 HYPERLIPIDEMIA, UNSPECIFIED HYPERLIPIDEMIA TYPE: Chronic | ICD-10-CM

## 2020-07-10 ENCOUNTER — TELEPHONE (OUTPATIENT)
Dept: FAMILY MEDICINE CLINIC | Facility: CLINIC | Age: 60
End: 2020-07-10

## 2020-07-10 DIAGNOSIS — E78.5 HYPERLIPIDEMIA, UNSPECIFIED HYPERLIPIDEMIA TYPE: Chronic | ICD-10-CM

## 2020-07-10 RX ORDER — ATORVASTATIN CALCIUM 10 MG/1
10 TABLET, FILM COATED ORAL NIGHTLY
Qty: 90 TABLET | Refills: 0 | Status: SHIPPED | OUTPATIENT
Start: 2020-07-10 | End: 2020-10-12

## 2020-07-10 NOTE — TELEPHONE ENCOUNTER
PT CALLED IN FOR REFILL    PT CONTACT: [phone]    PT MEDS: atorvastatin (LIPITOR) 10 MG tablet

## 2020-08-14 ENCOUNTER — TELEPHONE (OUTPATIENT)
Dept: FAMILY MEDICINE CLINIC | Facility: CLINIC | Age: 60
End: 2020-08-14

## 2020-08-14 DIAGNOSIS — I25.10 CORONARY ARTERY DISEASE INVOLVING NATIVE CORONARY ARTERY OF NATIVE HEART WITHOUT ANGINA PECTORIS: Primary | Chronic | ICD-10-CM

## 2020-08-19 ENCOUNTER — LAB (OUTPATIENT)
Dept: LAB | Facility: HOSPITAL | Age: 60
End: 2020-08-19

## 2020-08-19 DIAGNOSIS — I25.10 CORONARY ARTERY DISEASE INVOLVING NATIVE CORONARY ARTERY OF NATIVE HEART WITHOUT ANGINA PECTORIS: Chronic | ICD-10-CM

## 2020-08-19 LAB
CHOLEST SERPL-MCNC: 226 MG/DL (ref 0–200)
HDLC SERPL-MCNC: 58 MG/DL (ref 40–60)
LDLC SERPL CALC-MCNC: 146 MG/DL (ref 0–100)
LDLC/HDLC SERPL: 2.51 {RATIO}
TRIGL SERPL-MCNC: 111 MG/DL (ref 0–150)
VLDLC SERPL-MCNC: 22.2 MG/DL (ref 5–40)

## 2020-08-19 PROCEDURE — 36415 COLL VENOUS BLD VENIPUNCTURE: CPT

## 2020-08-19 PROCEDURE — 80061 LIPID PANEL: CPT

## 2020-10-11 DIAGNOSIS — E78.5 HYPERLIPIDEMIA, UNSPECIFIED HYPERLIPIDEMIA TYPE: Chronic | ICD-10-CM

## 2020-10-12 ENCOUNTER — OFFICE VISIT (OUTPATIENT)
Dept: CARDIOLOGY | Facility: CLINIC | Age: 60
End: 2020-10-12

## 2020-10-12 VITALS
DIASTOLIC BLOOD PRESSURE: 88 MMHG | OXYGEN SATURATION: 98 % | HEART RATE: 53 BPM | SYSTOLIC BLOOD PRESSURE: 136 MMHG | BODY MASS INDEX: 27.35 KG/M2 | WEIGHT: 220 LBS | HEIGHT: 75 IN

## 2020-10-12 DIAGNOSIS — I49.3 PVC'S (PREMATURE VENTRICULAR CONTRACTIONS): ICD-10-CM

## 2020-10-12 DIAGNOSIS — I10 ESSENTIAL HYPERTENSION: ICD-10-CM

## 2020-10-12 DIAGNOSIS — E78.2 MIXED HYPERLIPIDEMIA: ICD-10-CM

## 2020-10-12 DIAGNOSIS — I25.10 CORONARY ARTERY DISEASE INVOLVING NATIVE CORONARY ARTERY OF NATIVE HEART WITHOUT ANGINA PECTORIS: Primary | ICD-10-CM

## 2020-10-12 PROCEDURE — 99214 OFFICE O/P EST MOD 30 MIN: CPT | Performed by: INTERNAL MEDICINE

## 2020-10-12 PROCEDURE — 93000 ELECTROCARDIOGRAM COMPLETE: CPT | Performed by: INTERNAL MEDICINE

## 2020-10-12 RX ORDER — ATORVASTATIN CALCIUM 10 MG/1
TABLET, FILM COATED ORAL
Qty: 90 TABLET | Refills: 0 | Status: SHIPPED | OUTPATIENT
Start: 2020-10-12 | End: 2020-11-06 | Stop reason: SDUPTHER

## 2020-10-12 NOTE — PROGRESS NOTES
Shayla Kebede  60 y.o. male    10/12/2020  Chief Complaint   Patient presents with   • Follow-up   • Coronary Artery Disease       History of Present Illness  Patient with a history of coronary artery disease out with drug-eluting stents to the LAD and circumflex.  He also has underwent a recent PVC ablation.  All in all he is feeling well.  Having no chest pain.  Frequent PVCs.  His primary.  Cholesterol is very hard to control.  He is very intolerant of statins including symptoms, treatment plan and atorvastatin.  He has tried 10 mg but it is helpful for him for myalgias.        SUBJECTIVE    No Known Allergies      Past Medical History:   Diagnosis Date   • Acquired equinus deformity of foot    • Anxiety    • Anxiety state    • Coronary arteriosclerosis    • Foreign body in conjunctival sac     OD   • Ganglion cyst     Ganglion/synovial cyst - hand      • Hyperlipidemia    • Insomnia    • Knee pain    • Neck pain     right upper extremity radiculopathy   • Pain     plantar heel pain   • Pain in right arm    • Plantar fasciitis    • Presbyopia          Past Surgical History:   Procedure Laterality Date   • CARDIAC CATHETERIZATION  03/04/2015    Cardiac cath 65229 (2)    Critical lesion in the circumflex coronary artery, proximal/mid, and successful PCI w/balloon angioplasty.Noncritical disease in the RCA and LAD.Normal LV systolic function with Ef of 55% to 60%.    • COLONOSCOPY N/A 5/18/2017    Procedure: COLONOSCOPY;  Surgeon: Curly Mann DO;  Location: [facility] ENDOSCOPY;  Service:    • INJECTION OF MEDICATION  10/10/2014    Dexamethasone (1)      • INJECTION OF MEDICATION  10/10/2014    Kenalog (1)    • KNEE ACL RECONSTRUCTION     • KNEE ARTHROSCOPY      Knee arthroscopy, surgery (1)      • OTHER SURGICAL HISTORY  10/10/2014    Inj(s) Tend-Sheath, Ligament, Single 29185 (1)     • TOTAL KNEE ARTHROPLASTY      Total knee replacement (1)    done in the late to mid 90's          Family History  "  Problem Relation Age of Onset   • Aneurysm Mother    • Heart attack Father          Social History     Socioeconomic History   • Marital status:      Spouse name: Not on file   • Number of children: Not on file   • Years of education: Not on file   • Highest education level: Not on file   Tobacco Use   • Smoking status: Former Smoker   • Smokeless tobacco: Never Used   Substance and Sexual Activity   • Alcohol use: Yes     Frequency: Never     Comment: social   • Drug use: No   • Sexual activity: Defer         Prior to Admission medications    Medication Sig Start Date End Date Taking? Authorizing Provider   aspirin 81 MG EC tablet Take 1 tablet by mouth Daily. 7/23/19  Yes Amber Dee MD   atorvastatin (LIPITOR) 10 MG tablet Take 1 tablet by mouth Every Night. For chol 7/10/20  Yes Piyush Bernstein MD   clonazePAM (KlonoPIN) 0.5 MG tablet Take 1 tablet by mouth At Night As Needed (sleep). 6/17/19  Yes Piyush Bernstein MD   sildenafil (VIAGRA) 100 MG tablet Take 1 tablet by mouth As Needed for erectile dysfunction. 0.5 to 1 as needed 1 hour before intercoarse 8/5/19  Yes Piyush Bernstein MD   metoprolol tartrate (LOPRESSOR) 25 MG tablet Take 0.5 tablets by mouth Every Night. 2/4/20   Amber Dee MD         OBJECTIVE    /88 (BP Location: Right arm, Patient Position: Sitting)   Pulse 53   Ht 190.5 cm (75\")   Wt 99.8 kg (220 lb)   SpO2 98%   BMI 27.50 kg/m²         Review of Systems     Constitutional:  Denies recent weight loss, weight gain, fever or chills, no change in exercise tolerance     HENT:  Denies any hearing loss, epistaxis, hoarseness, or difficulty speaking.     Eyes: Wears eyeglasses or contact lenses     Respiratory:  Denies dyspnea with exertion,no cough, wheezing, or hemoptysis.     Cardiovascular: Negative for palpations, chest pain, orthopnea, PND, peripheral edema, syncope, or claudication.     Gastrointestinal:  Denies change in bowel habits, dyspepsia, ulcer disease, " hematochezia, or melena.     Endocrine: Negative for cold intolerance, heat intolerance, polydipsia, polyphagia and polyuria. Denies any history of weight change, heat/cold intolerance, polydipsia, polyuria     Genitourinary: Negative.      Musculoskeletal: Denies any history of arthritic symptoms or back problems     Skin:  Denies any change in hair or nails, rashes, or skin lesions.     Allergic/Immunologic: Negative.  Negative for environmental allergies, food allergies and immunocompromised state.     Neurological:  Denies any history of recurrent headaches, strokes, TIA, or seizure disorder.     Hematological: Denies any food allergies, seasonal allergies, bleeding disorders, or lymphadenopathy.     Psychiatric/Behavioral: Denies any history of depression, substance abuse, or change in cognitive function.         Physical Exam     Constitutional: Cooperative, alert and oriented, well-developed, well-nourished, in no acute distress.     HENT:   Head: Normocephalic, normal hair patterns, no masses or tenderness.  Ears, Nose, and Throat: No gross abnormalities. No pallor or cyanosis. Dentition good.   Eyes: EOMS intact, PERRL, conjunctivae and lids unremarkable. Fundoscopic exam and visual fields not performed.   Neck: No palpable masses or adenopathy, no thyromegaly, no JVD, carotid pulses are full and equal bilaterally and without  Bruits.     Cardiovascular: Regular rhythm, S1 and S2 normal, no S3 or S4. Apical impulse not displaced. No murmurs, gallops, or rubs detected.     Pulmonary/Chest: Chest: normal symmetry, no tenderness to palpation, normal respiratory excursion, no intercostal retraction, no use of accessory muscles.            Pulmonary: Normal breath sounds. No rales or ronchi.    Abdominal: Abdomen soft, bowel sounds normoactive, no masses, no hepatosplenomegaly, non-tender, no bruits.     Musculoskeletal: No deformities, clubbing, cyanosis, erythema, or edema observed. There are no spinal  abnormalities noted. Normal muscle strength and tone. Pulses full and equal in all extremities, no bruits auscultated.     Neurological: No gross motor or sensory deficits noted, affect appropriate, oriented to time, person, place.     Skin: Warm and dry to the touch, no apparent skin lesions or masses noted.     Psychiatric: He has a normal mood and affect. His behavior is normal. Judgment and thought content normal.         Procedures      Lab Results   Component Value Date    WBC 6.64 05/22/2020    HGB 15.6 05/22/2020    HCT 44.7 05/22/2020    MCV 89.2 05/22/2020     05/22/2020     Lab Results   Component Value Date    GLUCOSE 115 (H) 05/22/2020    BUN 13 05/22/2020    CREATININE 1.07 05/22/2020    EGFRIFNONA 70 05/22/2020    BCR 12.1 05/22/2020    CO2 23.7 05/22/2020    CALCIUM 9.8 05/22/2020    ALBUMIN 4.30 01/06/2020    AST 27 01/06/2020    ALT 18 01/06/2020     Lab Results   Component Value Date    CHOL 226 (H) 08/19/2020    CHOL 213 (H) 01/06/2020    CHOL 171 08/10/2018     Lab Results   Component Value Date    TRIG 111 08/19/2020    TRIG 114 01/06/2020    TRIG 84 08/10/2018     Lab Results   Component Value Date    HDL 58 08/19/2020    HDL 51 01/06/2020    HDL 48 (L) 08/10/2018     No components found for: LDLCALC  Lab Results   Component Value Date     (H) 08/19/2020     (H) 01/06/2020    LDL 91 08/10/2018     No results found for: HDLLDLRATIO  No components found for: CHOLHDL  No results found for: HGBA1C  Lab Results   Component Value Date    TSH 2.51 07/10/2015           ASSESSMENT AND PLAN      Shayla was seen today for follow-up and coronary artery disease.    Diagnoses and all orders for this visit:    Coronary artery disease involving native coronary artery of native heart without angina pectoris  -     ECG 12 Lead  -     Evolocumab solution prefilled syringe 140 mg  If stable from this.  Cholesterol still out of control intolerant to statins.  We will try to get him approved for  Repatha.  Essential hypertension  This is overall under good control.  Mixed hyperlipidemia  We are going to try to get him on Repatha to help control given his history of coronary disease shown.  PVC's (premature ventricular contractions)  Status post ablation with no further symptoms.      Patient's Body mass index is 27.5 kg/m². BMI is within normal parameters. No follow-up required..                Sarah Srivastava MD  10/12/2020  10:13 CDT

## 2020-11-06 ENCOUNTER — OFFICE VISIT (OUTPATIENT)
Dept: FAMILY MEDICINE CLINIC | Facility: CLINIC | Age: 60
End: 2020-11-06

## 2020-11-06 VITALS
HEIGHT: 75 IN | BODY MASS INDEX: 27.04 KG/M2 | DIASTOLIC BLOOD PRESSURE: 84 MMHG | SYSTOLIC BLOOD PRESSURE: 132 MMHG | WEIGHT: 217.5 LBS

## 2020-11-06 DIAGNOSIS — F41.9 ANXIETY: Chronic | ICD-10-CM

## 2020-11-06 DIAGNOSIS — I10 ESSENTIAL HYPERTENSION: Primary | Chronic | ICD-10-CM

## 2020-11-06 DIAGNOSIS — E78.5 HYPERLIPIDEMIA, UNSPECIFIED HYPERLIPIDEMIA TYPE: Chronic | ICD-10-CM

## 2020-11-06 DIAGNOSIS — I25.10 CORONARY ARTERY DISEASE INVOLVING NATIVE CORONARY ARTERY OF NATIVE HEART WITHOUT ANGINA PECTORIS: Chronic | ICD-10-CM

## 2020-11-06 DIAGNOSIS — Z23 NEED FOR IMMUNIZATION AGAINST INFLUENZA: ICD-10-CM

## 2020-11-06 PROBLEM — Z15.89 MONOALLELIC MUTATION OF PACS1 GENE: Chronic | Status: ACTIVE | Noted: 2020-05-01

## 2020-11-06 PROCEDURE — 90686 IIV4 VACC NO PRSV 0.5 ML IM: CPT | Performed by: FAMILY MEDICINE

## 2020-11-06 PROCEDURE — 90471 IMMUNIZATION ADMIN: CPT | Performed by: FAMILY MEDICINE

## 2020-11-06 PROCEDURE — 99214 OFFICE O/P EST MOD 30 MIN: CPT | Performed by: FAMILY MEDICINE

## 2020-11-06 RX ORDER — ATORVASTATIN CALCIUM 10 MG/1
10 TABLET, FILM COATED ORAL NIGHTLY
Qty: 90 TABLET | Refills: 3 | Status: SHIPPED | OUTPATIENT
Start: 2020-11-06 | End: 2021-04-26

## 2020-11-06 RX ORDER — CLONAZEPAM 0.5 MG/1
0.5 TABLET ORAL NIGHTLY PRN
Qty: 30 TABLET | Refills: 3 | Status: SHIPPED | OUTPATIENT
Start: 2020-11-06 | End: 2022-01-21 | Stop reason: SDUPTHER

## 2020-11-06 NOTE — PROGRESS NOTES
Subjective   Shayla Kebede is a 60 y.o. male.  Reevaluation coronary disease paroxysmal atrial fibrillation history of inguinal hernia mild generalized anxiety.  In interim has had ablation therapy for his atrial fibrillation is also had inguinal hernia repair.  Was suggested to use injectable medicine for cholesterol but has not used it yet.  Have counseled on the fact that the statin drugs will probably adequate as long as he can tolerate the minimal side effect.  Last lab work noted.  Does need a flu vaccine.  Is up-to-date on everything else.    History of Present Illness   HPI    The following portions of the patient's history were reviewed and updated as appropriate: allergies, current medications, past family history, past medical history, past social history, past surgical history and problem list.    Review of Systems  Review of Systems   Constitutional: Negative for activity change, appetite change, fatigue and unexpected weight change.   HENT: Negative for trouble swallowing and voice change.    Eyes: Negative for redness and visual disturbance.   Respiratory: Negative for cough and wheezing.    Cardiovascular: Negative for chest pain and palpitations.   Gastrointestinal: Negative for abdominal pain, constipation, diarrhea, nausea and vomiting.   Genitourinary: Negative for urgency.   Musculoskeletal: Negative for joint swelling.   Neurological: Negative for syncope and headaches.   Hematological: Negative for adenopathy.   Psychiatric/Behavioral: Negative for sleep disturbance.       Objective   Physical Exam  Physical Exam  Constitutional:       Appearance: He is well-developed.   HENT:      Head: Normocephalic.   Eyes:      Pupils: Pupils are equal, round, and reactive to light.   Neck:      Musculoskeletal: Normal range of motion.      Thyroid: No thyromegaly.   Cardiovascular:      Rate and Rhythm: Normal rate and regular rhythm.      Heart sounds: Normal heart sounds. No murmur. No friction  "rub. No gallop.    Pulmonary:      Breath sounds: Normal breath sounds.   Abdominal:      General: There is no distension.      Palpations: Abdomen is soft. There is no mass.      Tenderness: There is no abdominal tenderness.   Musculoskeletal: Normal range of motion.      Comments: No peripheral edema   Skin:     General: Skin is warm and dry.   Neurological:      Mental Status: He is alert and oriented to person, place, and time.      Deep Tendon Reflexes: Reflexes are normal and symmetric.   Psychiatric:         Attention and Perception: Attention normal.         Mood and Affect: Mood normal.         Speech: Speech normal.           Visit Vitals  /84   Ht 190.5 cm (75\")   Wt 98.7 kg (217 lb 8 oz)   BMI 27.19 kg/m²     Body mass index is 27.19 kg/m².      Assessment/Plan   Diagnoses and all orders for this visit:    1. Essential hypertension (Primary)    2. Hyperlipidemia, unspecified hyperlipidemia type  -     atorvastatin (LIPITOR) 10 MG tablet; Take 1 tablet by mouth Every Night.  Dispense: 90 tablet; Refill: 3    3. Coronary artery disease involving native coronary artery of native heart without angina pectoris    4. Anxiety  -     clonazePAM (KlonoPIN) 0.5 MG tablet; Take 1 tablet by mouth At Night As Needed (sleep).  Dispense: 30 tablet; Refill: 3    5. Need for immunization against influenza  -     Fluarix/Fluzone/Afluria Quad>6 Months    Counseled secondary coronary prevention with the use of aspirin statin drugs etc. defer post ablative therapy to cardiology.  Counseled on lifestyle measures.  Recheck 6 months.  "

## 2020-12-07 ENCOUNTER — TELEPHONE (OUTPATIENT)
Dept: CARDIOLOGY | Facility: CLINIC | Age: 60
End: 2020-12-07

## 2020-12-07 NOTE — TELEPHONE ENCOUNTER
Called patient to remind him of the lab work that Dr. Srivastava ordered, let him know that one is fasting,     Left voicemail

## 2020-12-11 ENCOUNTER — LAB (OUTPATIENT)
Dept: LAB | Facility: HOSPITAL | Age: 60
End: 2020-12-11

## 2020-12-11 LAB
ALBUMIN SERPL-MCNC: 4.6 G/DL (ref 3.5–5.2)
ALBUMIN/GLOB SERPL: 1.8 G/DL
ALP SERPL-CCNC: 59 U/L (ref 39–117)
ALT SERPL W P-5'-P-CCNC: 27 U/L (ref 1–41)
ANION GAP SERPL CALCULATED.3IONS-SCNC: 9.6 MMOL/L (ref 5–15)
AST SERPL-CCNC: 28 U/L (ref 1–40)
BILIRUB SERPL-MCNC: 0.6 MG/DL (ref 0–1.2)
BUN SERPL-MCNC: 15 MG/DL (ref 8–23)
BUN/CREAT SERPL: 14.7 (ref 7–25)
CALCIUM SPEC-SCNC: 9.6 MG/DL (ref 8.6–10.5)
CHLORIDE SERPL-SCNC: 103 MMOL/L (ref 98–107)
CHOLEST SERPL-MCNC: 198 MG/DL (ref 0–200)
CO2 SERPL-SCNC: 27.4 MMOL/L (ref 22–29)
CREAT SERPL-MCNC: 1.02 MG/DL (ref 0.76–1.27)
GFR SERPL CREATININE-BSD FRML MDRD: 74 ML/MIN/1.73
GLOBULIN UR ELPH-MCNC: 2.6 GM/DL
GLUCOSE SERPL-MCNC: 94 MG/DL (ref 65–99)
HDLC SERPL-MCNC: 52 MG/DL (ref 40–60)
LDLC SERPL CALC-MCNC: 132 MG/DL (ref 0–100)
LDLC/HDLC SERPL: 2.5 {RATIO}
POTASSIUM SERPL-SCNC: 4.7 MMOL/L (ref 3.5–5.2)
PROT SERPL-MCNC: 7.2 G/DL (ref 6–8.5)
SODIUM SERPL-SCNC: 140 MMOL/L (ref 136–145)
TRIGL SERPL-MCNC: 79 MG/DL (ref 0–150)
VLDLC SERPL-MCNC: 14 MG/DL (ref 5–40)

## 2020-12-11 PROCEDURE — 36415 COLL VENOUS BLD VENIPUNCTURE: CPT | Performed by: INTERNAL MEDICINE

## 2020-12-11 PROCEDURE — 80053 COMPREHEN METABOLIC PANEL: CPT | Performed by: INTERNAL MEDICINE

## 2020-12-11 PROCEDURE — 80061 LIPID PANEL: CPT | Performed by: INTERNAL MEDICINE

## 2020-12-14 ENCOUNTER — DOCUMENTATION (OUTPATIENT)
Dept: PULMONOLOGY | Facility: CLINIC | Age: 60
End: 2020-12-14

## 2020-12-14 NOTE — PROGRESS NOTES
Per Dr. Srivastava cholesterol test results were given to Mr. Kebede.  He stated he never heard from anyone at the office to start the Repatha, and after doing research on it, he does not want to start the Repatha.  He is taking 10 mb. Of Lipitor and would like to continue taking it.

## 2021-03-01 ENCOUNTER — TELEPHONE (OUTPATIENT)
Dept: FAMILY MEDICINE CLINIC | Facility: CLINIC | Age: 61
End: 2021-03-01

## 2021-03-01 NOTE — TELEPHONE ENCOUNTER
PATIENT CALLED AND IS REQUESTING LAB ORDER FOR PSA LEVEL BE SENT IN.    PLEASE CALL PATIENT AND LET HIM KNOW IF/WHEN THIS IS DONE.    [phone]

## 2021-03-02 DIAGNOSIS — Z12.5 SCREENING FOR MALIGNANT NEOPLASM OF PROSTATE: Primary | ICD-10-CM

## 2021-03-03 ENCOUNTER — LAB (OUTPATIENT)
Dept: LAB | Facility: HOSPITAL | Age: 61
End: 2021-03-03

## 2021-03-03 DIAGNOSIS — Z12.5 SCREENING FOR MALIGNANT NEOPLASM OF PROSTATE: ICD-10-CM

## 2021-03-03 LAB — PSA SERPL-MCNC: 1.19 NG/ML (ref 0–4)

## 2021-03-03 PROCEDURE — 36415 COLL VENOUS BLD VENIPUNCTURE: CPT

## 2021-03-03 PROCEDURE — G0103 PSA SCREENING: HCPCS

## 2021-04-19 ENCOUNTER — OFFICE VISIT (OUTPATIENT)
Dept: CARDIOLOGY | Facility: CLINIC | Age: 61
End: 2021-04-19

## 2021-04-19 VITALS
DIASTOLIC BLOOD PRESSURE: 82 MMHG | BODY MASS INDEX: 26.73 KG/M2 | WEIGHT: 215 LBS | HEART RATE: 69 BPM | SYSTOLIC BLOOD PRESSURE: 136 MMHG | OXYGEN SATURATION: 98 % | HEIGHT: 75 IN

## 2021-04-19 DIAGNOSIS — I25.10 CORONARY ARTERY DISEASE INVOLVING NATIVE CORONARY ARTERY OF NATIVE HEART WITHOUT ANGINA PECTORIS: Primary | ICD-10-CM

## 2021-04-19 DIAGNOSIS — I10 ESSENTIAL HYPERTENSION: ICD-10-CM

## 2021-04-19 DIAGNOSIS — E78.2 MIXED HYPERLIPIDEMIA: ICD-10-CM

## 2021-04-19 PROCEDURE — 99213 OFFICE O/P EST LOW 20 MIN: CPT | Performed by: INTERNAL MEDICINE

## 2021-04-19 NOTE — PROGRESS NOTES
Shayla Kebede  60 y.o. male    04/19/2021  Chief Complaint   Patient presents with   • Coronary Artery Disease   • Hypertension       History of Present Illness    Patient with a history of coronary disease stent hyperlipidemia  -        SUBJECTIVE  Patient overall is doing well.  Has a history of coronary disease with a stent to the right coronary artery.  Is having no angina.  He is having no shortness of air.  He is pretty intolerant to statins and has been trying intermittent fasting can help his cholesterol.  No Known Allergies      Past Medical History:   Diagnosis Date   • Acquired equinus deformity of foot    • Anxiety    • Anxiety state    • Coronary arteriosclerosis    • Foreign body in conjunctival sac     OD   • Ganglion cyst     Ganglion/synovial cyst - hand      • Hyperlipidemia    • Insomnia    • Knee pain    • Neck pain     right upper extremity radiculopathy   • Pain     plantar heel pain   • Pain in right arm    • Plantar fasciitis    • Presbyopia          Past Surgical History:   Procedure Laterality Date   • CARDIAC ABLATION  2020   • CARDIAC CATHETERIZATION  03/04/2015    Cardiac cath 32316 (2)    Critical lesion in the circumflex coronary artery, proximal/mid, and successful PCI w/balloon angioplasty.Noncritical disease in the RCA and LAD.Normal LV systolic function with Ef of 55% to 60%.    • COLONOSCOPY N/A 5/18/2017    Procedure: COLONOSCOPY;  Surgeon: Curly Mann DO;  Location: [facility] ENDOSCOPY;  Service:    • INGUINAL HERNIA REPAIR  2020   • INJECTION OF MEDICATION  10/10/2014    Dexamethasone (1)      • INJECTION OF MEDICATION  10/10/2014    Kenalog (1)    • KNEE ACL RECONSTRUCTION     • KNEE ARTHROSCOPY      Knee arthroscopy, surgery (1)      • OTHER SURGICAL HISTORY  10/10/2014    Inj(s) Tend-Sheath, Ligament, Single 96125 (1)     • TOTAL KNEE ARTHROPLASTY      Total knee replacement (1)    done in the late to mid 90's          Family History   Problem Relation Age of  "Onset   • Aneurysm Mother    • Heart attack Father          Social History     Socioeconomic History   • Marital status:      Spouse name: Not on file   • Number of children: Not on file   • Years of education: Not on file   • Highest education level: Not on file   Tobacco Use   • Smoking status: Former Smoker   • Smokeless tobacco: Never Used   Vaping Use   • Vaping Use: Never used   Substance and Sexual Activity   • Alcohol use: Yes     Comment: social   • Drug use: No   • Sexual activity: Defer         Prior to Admission medications    Medication Sig Start Date End Date Taking? Authorizing Provider   aspirin 81 MG EC tablet Take 1 tablet by mouth Daily. 7/23/19  Yes Amber Dee MD   clonazePAM (KlonoPIN) 0.5 MG tablet Take 1 tablet by mouth At Night As Needed (sleep). 11/6/20  Yes Piyush Bernstein MD   sildenafil (VIAGRA) 100 MG tablet Take 1 tablet by mouth As Needed for erectile dysfunction. 0.5 to 1 as needed 1 hour before intercoarse 8/5/19  Yes Piyush Bernstein MD   atorvastatin (LIPITOR) 10 MG tablet Take 1 tablet by mouth Every Night. 11/6/20   Piyush Bernstein MD         OBJECTIVE    /82   Pulse 69   Ht 190.5 cm (75\")   Wt 97.5 kg (215 lb)   SpO2 98%   BMI 26.87 kg/m²         Review of Systems     Constitutional:  Denies recent weight loss, weight gain, fever or chills, no change in exercise tolerance     HENT:  Denies any hearing loss, epistaxis, hoarseness, or difficulty speaking.     Eyes: Wears eyeglasses or contact lenses     Respiratory:  Denies dyspnea with exertion,no cough, wheezing, or hemoptysis.     Cardiovascular: Negative for palpations, chest pain, orthopnea, PND, peripheral edema, syncope, or claudication.     Gastrointestinal:  Denies change in bowel habits, dyspepsia, ulcer disease, hematochezia, or melena.     Endocrine: Negative for cold intolerance, heat intolerance, polydipsia, polyphagia and polyuria. Denies any history of weight change, heat/cold intolerance, " polydipsia, polyuria     Genitourinary: Negative.      Musculoskeletal: Denies any history of arthritic symptoms or back problems     Skin:  Denies any change in hair or nails, rashes, or skin lesions.     Allergic/Immunologic: Negative.  Negative for environmental allergies, food allergies and immunocompromised state.     Neurological:  Denies any history of recurrent headaches, strokes, TIA, or seizure disorder.     Hematological: Denies any food allergies, seasonal allergies, bleeding disorders, or lymphadenopathy.     Psychiatric/Behavioral: Denies any history of depression, substance abuse, or change in cognitive function.         Physical Exam     Constitutional: Cooperative, alert and oriented, well-developed, well-nourished, in no acute distress.     HENT:   Head: Normocephalic, normal hair patterns, no masses or tenderness.  Ears, Nose, and Throat: No gross abnormalities. No pallor or cyanosis. Dentition good.   Eyes: EOMS intact, PERRL, conjunctivae and lids unremarkable. Fundoscopic exam and visual fields not performed.   Neck: No palpable masses or adenopathy, no thyromegaly, no JVD, carotid pulses are full and equal bilaterally and without  Bruits.     Cardiovascular: Regular rhythm, S1 and S2 normal, no S3 or S4. Apical impulse not displaced. No murmurs, gallops, or rubs detected.     Pulmonary/Chest: Chest: normal symmetry, no tenderness to palpation, normal respiratory excursion, no intercostal retraction, no use of accessory muscles.            Pulmonary: Normal breath sounds. No rales or ronchi.    Abdominal: Abdomen soft, bowel sounds normoactive, no masses, no hepatosplenomegaly, non-tender, no bruits.     Musculoskeletal: No deformities, clubbing, cyanosis, erythema, or edema observed. There are no spinal abnormalities noted. Normal muscle strength and tone. Pulses full and equal in all extremities, no bruits auscultated.     Neurological: No gross motor or sensory deficits noted, affect  appropriate, oriented to time, person, place.     Skin: Warm and dry to the touch, no apparent skin lesions or masses noted.     Psychiatric: He has a normal mood and affect. His behavior is normal. Judgment and thought content normal.         Procedures      Lab Results   Component Value Date    WBC 6.64 05/22/2020    HGB 15.6 05/22/2020    HCT 44.7 05/22/2020    MCV 89.2 05/22/2020     05/22/2020     Lab Results   Component Value Date    GLUCOSE 94 12/11/2020    BUN 15 12/11/2020    CREATININE 1.02 12/11/2020    EGFRIFNONA 74 12/11/2020    BCR 14.7 12/11/2020    CO2 27.4 12/11/2020    CALCIUM 9.6 12/11/2020    ALBUMIN 4.60 12/11/2020    AST 28 12/11/2020    ALT 27 12/11/2020     Lab Results   Component Value Date    CHOL 198 12/11/2020    CHOL 226 (H) 08/19/2020    CHOL 213 (H) 01/06/2020     Lab Results   Component Value Date    TRIG 79 12/11/2020    TRIG 111 08/19/2020    TRIG 114 01/06/2020     Lab Results   Component Value Date    HDL 52 12/11/2020    HDL 58 08/19/2020    HDL 51 01/06/2020     No components found for: LDLCALC  Lab Results   Component Value Date     (H) 12/11/2020     (H) 08/19/2020     (H) 01/06/2020     No results found for: HDLLDLRATIO  No components found for: CHOLHDL  No results found for: HGBA1C  Lab Results   Component Value Date    TSH 2.51 07/10/2015           ASSESSMENT AND PLAN      Diagnoses and all orders for this visit:    1. Coronary artery disease involving native coronary artery of native heart without angina pectoris (Primary)    2. Essential hypertension    3. Mixed hyperlipidemia    4.  Paroxysmal atrial fibrillation    We will go ahead and have him do a fasting lipid profile and CMP.  He is intolerant of statins and is actually not taking anything at the present time.  We talked about Repatha and the other injectables and he is trying to decide on whether to go ahead and do that or not.  He is having rare palpitations he said really Mallampati).   His blood pressure shows a right  Patient's Body mass index is 26.87 kg/m². BMI is within normal parameters. No follow-up required..                Sarah Srivastava MD  4/19/2021  14:09 CDT

## 2021-04-22 ENCOUNTER — LAB (OUTPATIENT)
Dept: LAB | Facility: HOSPITAL | Age: 61
End: 2021-04-22

## 2021-04-22 LAB
ALBUMIN SERPL-MCNC: 4.1 G/DL (ref 3.5–5.2)
ALBUMIN/GLOB SERPL: 1.5 G/DL
ALP SERPL-CCNC: 47 U/L (ref 39–117)
ALT SERPL W P-5'-P-CCNC: 16 U/L (ref 1–41)
ANION GAP SERPL CALCULATED.3IONS-SCNC: 10 MMOL/L (ref 5–15)
AST SERPL-CCNC: 20 U/L (ref 1–40)
BILIRUB SERPL-MCNC: 0.7 MG/DL (ref 0–1.2)
BUN SERPL-MCNC: 12 MG/DL (ref 8–23)
BUN/CREAT SERPL: 12.2 (ref 7–25)
CALCIUM SPEC-SCNC: 9.2 MG/DL (ref 8.6–10.5)
CHLORIDE SERPL-SCNC: 102 MMOL/L (ref 98–107)
CHOLEST SERPL-MCNC: 239 MG/DL (ref 0–200)
CO2 SERPL-SCNC: 26 MMOL/L (ref 22–29)
CREAT SERPL-MCNC: 0.98 MG/DL (ref 0.76–1.27)
GFR SERPL CREATININE-BSD FRML MDRD: 78 ML/MIN/1.73
GLOBULIN UR ELPH-MCNC: 2.8 GM/DL
GLUCOSE SERPL-MCNC: 83 MG/DL (ref 65–99)
HDLC SERPL-MCNC: 56 MG/DL (ref 40–60)
LDLC SERPL CALC-MCNC: 167 MG/DL (ref 0–100)
LDLC/HDLC SERPL: 2.95 {RATIO}
POTASSIUM SERPL-SCNC: 4.1 MMOL/L (ref 3.5–5.2)
PROT SERPL-MCNC: 6.9 G/DL (ref 6–8.5)
SODIUM SERPL-SCNC: 138 MMOL/L (ref 136–145)
TRIGL SERPL-MCNC: 89 MG/DL (ref 0–150)
VLDLC SERPL-MCNC: 16 MG/DL (ref 5–40)

## 2021-04-22 PROCEDURE — 36415 COLL VENOUS BLD VENIPUNCTURE: CPT | Performed by: INTERNAL MEDICINE

## 2021-04-22 PROCEDURE — 80061 LIPID PANEL: CPT | Performed by: INTERNAL MEDICINE

## 2021-04-22 PROCEDURE — 80053 COMPREHEN METABOLIC PANEL: CPT | Performed by: INTERNAL MEDICINE

## 2021-04-23 ENCOUNTER — TELEPHONE (OUTPATIENT)
Dept: CARDIOLOGY | Facility: CLINIC | Age: 61
End: 2021-04-23

## 2021-04-23 NOTE — TELEPHONE ENCOUNTER
Per Dr. Srivastava, I spoke with Mr. Kebede and explained that his cholesterol is elevated.  Dr. Srivastava stated  We need to decide on more aggressive therapy if he is agreeable.   Mr. Kebede stated he will talk to his wife and let us know Monday and let us know his decision.

## 2021-04-26 ENCOUNTER — PRIOR AUTHORIZATION (OUTPATIENT)
Dept: CARDIOLOGY | Facility: CLINIC | Age: 61
End: 2021-04-26

## 2021-04-26 NOTE — TELEPHONE ENCOUNTER
A prior authorization for Repatha has been filled out and faxed using Cover [employer] Med[employer].  This has been approved from 45/26/2021 - 10/23/2021.  Patients pharmacy has been notified of the approval.

## 2021-05-13 ENCOUNTER — OFFICE VISIT (OUTPATIENT)
Dept: FAMILY MEDICINE CLINIC | Facility: CLINIC | Age: 61
End: 2021-05-13

## 2021-05-13 VITALS
BODY MASS INDEX: 26.73 KG/M2 | WEIGHT: 215 LBS | DIASTOLIC BLOOD PRESSURE: 80 MMHG | SYSTOLIC BLOOD PRESSURE: 128 MMHG | HEIGHT: 75 IN

## 2021-05-13 DIAGNOSIS — N40.1 BENIGN PROSTATIC HYPERPLASIA WITH NOCTURIA: ICD-10-CM

## 2021-05-13 DIAGNOSIS — R35.1 NOCTURIA: Primary | ICD-10-CM

## 2021-05-13 DIAGNOSIS — R35.1 BENIGN PROSTATIC HYPERPLASIA WITH NOCTURIA: ICD-10-CM

## 2021-05-13 PROCEDURE — 99213 OFFICE O/P EST LOW 20 MIN: CPT | Performed by: FAMILY MEDICINE

## 2021-05-13 RX ORDER — TAMSULOSIN HYDROCHLORIDE 0.4 MG/1
CAPSULE ORAL
Qty: 180 CAPSULE | Refills: 1 | Status: SHIPPED | OUTPATIENT
Start: 2021-05-13 | End: 2022-01-21 | Stop reason: SDUPTHER

## 2021-05-13 NOTE — PROGRESS NOTES
"Subjective   Shayla Kebede is a 60 y.o. male.  Requesting evaluation of increasing nocturia.  Having increasing nocturia 5-6 times at night some mild frequency hesitancy during the day.  No real other urinary symptoms.  Last PSA done 2 months ago was actually below normal.  No blood no other symptoms.  History noted.    History of Present Illness   HPI    The following portions of the patient's history were reviewed and updated as appropriate: allergies, current medications, past family history, past medical history, past social history, past surgical history and problem list.    Review of Systems  Review of Systems   Constitutional: Negative for activity change, appetite change, fatigue and unexpected weight change.   HENT: Negative for trouble swallowing and voice change.    Eyes: Negative for redness and visual disturbance.   Respiratory: Negative for cough and wheezing.    Cardiovascular: Negative for chest pain and palpitations.   Gastrointestinal: Negative for abdominal pain, constipation, diarrhea, nausea and vomiting.   Genitourinary: Positive for difficulty urinating, frequency and urgency.   Musculoskeletal: Negative for joint swelling.   Neurological: Negative for syncope and headaches.   Hematological: Negative for adenopathy.   Psychiatric/Behavioral: Negative for sleep disturbance.       Objective   Physical Exam  Physical Exam  Constitutional:       Appearance: Normal appearance. He is normal weight.   Neurological:      Mental Status: He is alert.   Psychiatric:         Mood and Affect: Mood normal.         Behavior: Behavior normal.         Thought Content: Thought content normal.         Judgment: Judgment normal.           Visit Vitals  /80   Ht 190.5 cm (75\")   Wt 97.5 kg (215 lb)   BMI 26.87 kg/m²     Body mass index is 26.87 kg/m².      Assessment/Plan   Diagnoses and all orders for this visit:    1. Nocturia (Primary)  -     tamsulosin (FLOMAX) 0.4 MG capsule 24 hr capsule; 1-2 " qhs for prostate everyday  Dispense: 180 capsule; Refill: 1    2. Benign prostatic hyperplasia with nocturia  -     tamsulosin (FLOMAX) 0.4 MG capsule 24 hr capsule; 1-2 qhs for prostate everyday  Dispense: 180 capsule; Refill: 1    Pubic diagnostic trial of a low-dose Flomax at night for the next several weeks.  Counseled on treatment options and treatment guidelines.  We will follow-up based on results of this over the next several weeks.

## 2021-07-21 ENCOUNTER — OFFICE VISIT (OUTPATIENT)
Dept: FAMILY MEDICINE CLINIC | Facility: CLINIC | Age: 61
End: 2021-07-21

## 2021-07-21 VITALS
SYSTOLIC BLOOD PRESSURE: 122 MMHG | BODY MASS INDEX: 26.01 KG/M2 | HEIGHT: 75 IN | WEIGHT: 209.2 LBS | DIASTOLIC BLOOD PRESSURE: 80 MMHG

## 2021-07-21 DIAGNOSIS — I10 ESSENTIAL HYPERTENSION: Primary | Chronic | ICD-10-CM

## 2021-07-21 DIAGNOSIS — F41.9 ANXIETY: Chronic | ICD-10-CM

## 2021-07-21 DIAGNOSIS — E78.2 MIXED HYPERLIPIDEMIA: Chronic | ICD-10-CM

## 2021-07-21 PROCEDURE — 99213 OFFICE O/P EST LOW 20 MIN: CPT | Performed by: FAMILY MEDICINE

## 2021-07-21 NOTE — PROGRESS NOTES
Subjective   Shayla Kebede is a 61 y.o. male.  Reevaluation of distant coronary disease hyperlipidemia intolerance to statins mild generalized anxiety disorder diagnoses below.  In interim is switch diet is trying to be more plant-based try to exercise more.  Is lost weight blood pressure stabilized.  Is is now on an Repatha will let cardiology control same.  Last lab work acceptable.  Has had all vaccines up-to-date on all other.  Has had a little issue with elbow radial nerve entrapment but it seems to be resolving slowly.    History of Present Illness   HPI    The following portions of the patient's history were reviewed and updated as appropriate: allergies, current medications, past family history, past medical history, past social history, past surgical history and problem list.    Review of Systems  Review of Systems   Constitutional: Negative for activity change, appetite change, fatigue and unexpected weight change.   HENT: Negative for trouble swallowing and voice change.    Eyes: Negative for redness and visual disturbance.   Respiratory: Negative for cough and wheezing.    Cardiovascular: Negative for chest pain and palpitations.   Gastrointestinal: Negative for abdominal pain, constipation, diarrhea, nausea and vomiting.   Genitourinary: Negative for urgency.   Musculoskeletal: Negative for joint swelling.   Neurological: Positive for numbness (Right hand radial nerve distribution actually getting better related mainly to overuse of elbow). Negative for syncope and headaches.   Hematological: Negative for adenopathy.   Psychiatric/Behavioral: Negative for sleep disturbance.       Objective   Physical Exam  Physical Exam  Constitutional:       Appearance: He is well-developed.   HENT:      Head: Normocephalic.   Eyes:      Pupils: Pupils are equal, round, and reactive to light.   Neck:      Thyroid: No thyromegaly.   Cardiovascular:      Rate and Rhythm: Normal rate and regular rhythm.      Heart  "sounds: Normal heart sounds. No murmur heard.   No friction rub. No gallop.    Pulmonary:      Breath sounds: Normal breath sounds.   Abdominal:      General: There is no distension.      Palpations: Abdomen is soft. There is no mass.      Tenderness: There is no abdominal tenderness.   Musculoskeletal:         General: Normal range of motion.      Cervical back: Normal range of motion.      Comments: Get up and go 3 to 5 seconds gait normal   Skin:     General: Skin is warm and dry.   Neurological:      Mental Status: He is alert and oriented to person, place, and time.      Deep Tendon Reflexes: Reflexes are normal and symmetric.   Psychiatric:         Attention and Perception: Attention normal.         Mood and Affect: Mood normal.         Speech: Speech normal.         Behavior: Behavior normal.         Thought Content: Thought content normal.         Cognition and Memory: Cognition normal.           Visit Vitals  /80   Ht 190.5 cm (75\")   Wt 94.9 kg (209 lb 3.2 oz)   BMI 26.15 kg/m²     Body mass index is 26.15 kg/m².    /80   Ht 190.5 cm (75\")   Wt 94.9 kg (209 lb 3.2 oz)   BMI 26.15 kg/m²     Assessment/Plan   Diagnoses and all orders for this visit:    1. Essential hypertension (Primary)    2. Mixed hyperlipidemia    3. Anxiety    Counseled mainly on lifestyle measures following up with cardiology as outlined flu vaccine in the fall recheck 6 months  "

## 2021-08-30 ENCOUNTER — LAB (OUTPATIENT)
Dept: LAB | Facility: HOSPITAL | Age: 61
End: 2021-08-30

## 2021-08-30 DIAGNOSIS — E78.2 MIXED HYPERLIPIDEMIA: Primary | ICD-10-CM

## 2021-08-30 PROCEDURE — 80076 HEPATIC FUNCTION PANEL: CPT | Performed by: INTERNAL MEDICINE

## 2021-08-30 PROCEDURE — 36415 COLL VENOUS BLD VENIPUNCTURE: CPT | Performed by: INTERNAL MEDICINE

## 2021-08-30 PROCEDURE — 80061 LIPID PANEL: CPT | Performed by: INTERNAL MEDICINE

## 2021-08-31 ENCOUNTER — TELEPHONE (OUTPATIENT)
Dept: CARDIOLOGY | Facility: CLINIC | Age: 61
End: 2021-08-31

## 2021-08-31 LAB
ALBUMIN SERPL-MCNC: 4.6 G/DL (ref 3.5–5.2)
ALP SERPL-CCNC: 54 U/L (ref 39–117)
ALT SERPL W P-5'-P-CCNC: 19 U/L (ref 1–41)
AST SERPL-CCNC: 20 U/L (ref 1–40)
BILIRUB CONJ SERPL-MCNC: 0.2 MG/DL (ref 0–0.3)
BILIRUB INDIRECT SERPL-MCNC: 0.4 MG/DL
BILIRUB SERPL-MCNC: 0.6 MG/DL (ref 0–1.2)
CHOLEST SERPL-MCNC: 137 MG/DL (ref 0–200)
HDLC SERPL-MCNC: 60 MG/DL (ref 40–60)
LDLC SERPL CALC-MCNC: 57 MG/DL (ref 0–100)
LDLC/HDLC SERPL: 0.9 {RATIO}
PROT SERPL-MCNC: 7 G/DL (ref 6–8.5)
TRIGL SERPL-MCNC: 115 MG/DL (ref 0–150)
VLDLC SERPL-MCNC: 20 MG/DL (ref 5–40)

## 2021-08-31 NOTE — TELEPHONE ENCOUNTER
Per Dr. Srivastava, patient notified that his Lipids  look excellent.  Continue on the Repatha

## 2021-10-11 RX ORDER — EVOLOCUMAB 140 MG/ML
INJECTION, SOLUTION SUBCUTANEOUS
Qty: 1 ML | Refills: 3 | Status: SHIPPED | OUTPATIENT
Start: 2021-10-11 | End: 2022-02-14

## 2021-10-13 ENCOUNTER — TELEPHONE (OUTPATIENT)
Dept: CARDIOLOGY | Facility: CLINIC | Age: 61
End: 2021-10-13

## 2021-10-13 NOTE — TELEPHONE ENCOUNTER
Contacted PT per Dr. Srivastava about medication. Contacted pharmacy to see if a PA was needed and it was not.  PT voiced understanding.              ----- Message from Theresa Arreola sent at 10/13/2021  9:42 AM CDT -----  Contact: [phone]  He is on Repatha and it is working well for him.  It is due now and needs to see if another PA needed or is in process so he can     Anthony

## 2021-10-25 ENCOUNTER — OFFICE VISIT (OUTPATIENT)
Dept: CARDIOLOGY | Facility: CLINIC | Age: 61
End: 2021-10-25

## 2021-10-25 VITALS
DIASTOLIC BLOOD PRESSURE: 82 MMHG | WEIGHT: 214.2 LBS | SYSTOLIC BLOOD PRESSURE: 112 MMHG | HEART RATE: 54 BPM | HEIGHT: 75 IN | BODY MASS INDEX: 26.63 KG/M2 | OXYGEN SATURATION: 98 %

## 2021-10-25 DIAGNOSIS — I25.10 CORONARY ARTERY DISEASE INVOLVING NATIVE CORONARY ARTERY OF NATIVE HEART WITHOUT ANGINA PECTORIS: Primary | ICD-10-CM

## 2021-10-25 DIAGNOSIS — E78.5 HYPERLIPIDEMIA, UNSPECIFIED HYPERLIPIDEMIA TYPE: ICD-10-CM

## 2021-10-25 PROCEDURE — 93000 ELECTROCARDIOGRAM COMPLETE: CPT | Performed by: INTERNAL MEDICINE

## 2021-10-25 PROCEDURE — 99213 OFFICE O/P EST LOW 20 MIN: CPT | Performed by: INTERNAL MEDICINE

## 2021-10-25 NOTE — PROGRESS NOTES
Shayla Kebede  61 y.o. male    10/25/2021  Chief Complaint   Patient presents with   • Coronary Artery Disease     Chief Complaint   Hyperlipidemia, status post PCI stent to the LAD in the past    History of Present Illness  History of hyperlipidemia    -        SUBJECTIVE  We had started the patient on Repatha at this spring. His last cholesterol check was excellent. He is doing well. He is having no chest pain or the like.  No Known Allergies      Past Medical History:   Diagnosis Date   • Acquired equinus deformity of foot    • Anxiety    • Anxiety state    • Coronary arteriosclerosis    • Foreign body in conjunctival sac     OD   • Ganglion cyst     Ganglion/synovial cyst - hand      • Hyperlipidemia    • Insomnia    • Knee pain    • Neck pain     right upper extremity radiculopathy   • Pain     plantar heel pain   • Pain in right arm    • Plantar fasciitis    • Presbyopia          Past Surgical History:   Procedure Laterality Date   • CARDIAC ABLATION  2020   • CARDIAC CATHETERIZATION  03/04/2015    Cardiac cath 46789 (2)    Critical lesion in the circumflex coronary artery, proximal/mid, and successful PCI w/balloon angioplasty.Noncritical disease in the RCA and LAD.Normal LV systolic function with Ef of 55% to 60%.    • COLONOSCOPY N/A 5/18/2017    Procedure: COLONOSCOPY;  Surgeon: Curly Mann DO;  Location: [facility] ENDOSCOPY;  Service:    • INGUINAL HERNIA REPAIR  2020   • INJECTION OF MEDICATION  10/10/2014    Dexamethasone (1)      • INJECTION OF MEDICATION  10/10/2014    Kenalog (1)    • KNEE ACL RECONSTRUCTION     • KNEE ARTHROSCOPY      Knee arthroscopy, surgery (1)      • OTHER SURGICAL HISTORY  10/10/2014    Inj(s) Tend-Sheath, Ligament, Single 20550 (1)     • TOTAL KNEE ARTHROPLASTY      Total knee replacement (1)    done in the late to mid 90's          Family History   Problem Relation Age of Onset   • Aneurysm Mother    • Heart attack Father          Social History     Socioeconomic  "History   • Marital status:    Tobacco Use   • Smoking status: Former Smoker   • Smokeless tobacco: Never Used   Vaping Use   • Vaping Use: Never used   Substance and Sexual Activity   • Alcohol use: Yes     Comment: social   • Drug use: No   • Sexual activity: Defer         Prior to Admission medications    Medication Sig Start Date End Date Taking? Authorizing Provider   aspirin 81 MG EC tablet Take 1 tablet by mouth Daily. 7/23/19  Yes Amber Dee MD   clonazePAM (KlonoPIN) 0.5 MG tablet Take 1 tablet by mouth At Night As Needed (sleep). 11/6/20  Yes Piyush Bernstein MD   Repatha SureClick solution auto-injector SureClick injection INJECT 1 ML UNDER THE SKIN INTO THE APPROPRIATE AREA AS DIRECTED EVERY 14 DAYS 10/11/21  Yes Sarah Srivastava MD   sildenafil (VIAGRA) 100 MG tablet Take 1 tablet by mouth As Needed for erectile dysfunction. 0.5 to 1 as needed 1 hour before intercoarse 8/5/19  Yes Piyush Bernstein MD   tamsulosin (FLOMAX) 0.4 MG capsule 24 hr capsule 1-2 qhs for prostate everyday 5/13/21  Yes Piyush Bernstein MD         OBJECTIVE    /82 (BP Location: Left arm, Patient Position: Sitting, Cuff Size: Adult)   Pulse 54   Ht 190.5 cm (75\")   Wt 97.2 kg (214 lb 3.2 oz)   SpO2 98%   BMI 26.77 kg/m²         Review of Systems     Constitutional:  Denies recent weight loss, weight gain, fever or chills, no change in exercise tolerance     HENT:  Denies any hearing loss, epistaxis, hoarseness, or difficulty speaking.     Eyes: Wears eyeglasses or contact lenses     Respiratory:  Denies dyspnea with exertion,no cough, wheezing, or hemoptysis.     Cardiovascular: Negative for palpations, chest pain, orthopnea, PND, peripheral edema, syncope, or claudication.     Gastrointestinal:  Denies change in bowel habits, dyspepsia, ulcer disease, hematochezia, or melena.     Endocrine: Negative for cold intolerance, heat intolerance, polydipsia, polyphagia and polyuria. Denies any history of weight " change, heat/cold intolerance, polydipsia, polyuria     Genitourinary: Negative.      Musculoskeletal: Denies any history of arthritic symptoms or back problems     Skin:  Denies any change in hair or nails, rashes, or skin lesions.     Allergic/Immunologic: Negative.  Negative for environmental allergies, food allergies and immunocompromised state.     Neurological:  Denies any history of recurrent headaches, strokes, TIA, or seizure disorder.     Hematological: Denies any food allergies, seasonal allergies, bleeding disorders, or lymphadenopathy.     Psychiatric/Behavioral: Denies any history of depression, substance abuse, or change in cognitive function.         Physical Exam     Constitutional: Cooperative, alert and oriented, well-developed, well-nourished, in no acute distress.     HENT:   Head: Normocephalic, normal hair patterns, no masses or tenderness.  Ears, Nose, and Throat: No gross abnormalities. No pallor or cyanosis. Dentition good.   Eyes: EOMS intact, PERRL, conjunctivae and lids unremarkable. Fundoscopic exam and visual fields not performed.   Neck: No palpable masses or adenopathy, no thyromegaly, no JVD, carotid pulses are full and equal bilaterally and without  Bruits.     Cardiovascular: Regular rhythm, S1 and S2 normal, no S3 or S4. Apical impulse not displaced. No murmurs, gallops, or rubs detected.     Pulmonary/Chest: Chest: normal symmetry, no tenderness to palpation, normal respiratory excursion, no intercostal retraction, no use of accessory muscles.            Pulmonary: Normal breath sounds. No rales or ronchi.    Abdominal: Abdomen soft, bowel sounds normoactive, no masses, no hepatosplenomegaly, non-tender, no bruits.     Musculoskeletal: No deformities, clubbing, cyanosis, erythema, or edema observed. There are no spinal abnormalities noted. Normal muscle strength and tone. Pulses full and equal in all extremities, no bruits auscultated.     Neurological: No gross motor or sensory  deficits noted, affect appropriate, oriented to time, person, place.     Skin: Warm and dry to the touch, no apparent skin lesions or masses noted.     Psychiatric: He has a normal mood and affect. His behavior is normal. Judgment and thought content normal.         Procedures      Lab Results   Component Value Date    WBC 6.64 05/22/2020    HGB 15.6 05/22/2020    HCT 44.7 05/22/2020    MCV 89.2 05/22/2020     05/22/2020     Lab Results   Component Value Date    GLUCOSE 83 04/22/2021    BUN 12 04/22/2021    CREATININE 0.98 04/22/2021    EGFRIFNONA 78 04/22/2021    BCR 12.2 04/22/2021    CO2 26.0 04/22/2021    CALCIUM 9.2 04/22/2021    ALBUMIN 4.60 08/30/2021    AST 20 08/30/2021    ALT 19 08/30/2021     Lab Results   Component Value Date    CHOL 137 08/30/2021    CHOL 239 (H) 04/22/2021    CHOL 198 12/11/2020     Lab Results   Component Value Date    TRIG 115 08/30/2021    TRIG 89 04/22/2021    TRIG 79 12/11/2020     Lab Results   Component Value Date    HDL 60 08/30/2021    HDL 56 04/22/2021    HDL 52 12/11/2020     No components found for: LDLCALC  Lab Results   Component Value Date    LDL 57 08/30/2021     (H) 04/22/2021     (H) 12/11/2020     No results found for: HDLLDLRATIO  No components found for: CHOLHDL  No results found for: HGBA1C  Lab Results   Component Value Date    TSH 2.51 07/10/2015           ASSESSMENT AND PLAN      Diagnoses and all orders for this visit:    1. Coronary artery disease involving native coronary artery of native heart without angina pectoris (Primary)  -     ECG 12 Lead    2. Hyperlipidemia, unspecified hyperlipidemia type    Patient stable cardiac disease wise. His lipids have come under nice control once we started the Repatha. At this point we will make no significant change and plan on seeing him back in about 6 months. At that time we will go ahead and order a stat another lipid profile to have one done least on annual basis.    Patient's Body mass index  is 26.77 kg/m². indicating that he is overweight (BMI 25-29.9). Obesity-related health conditions include the following: dyslipidemias. Obesity is improving with lifestyle modifications. BMI is is above average; BMI management plan is completed. We discussed portion control and increasing exercise..                Sarah Srivastava MD  10/25/2021  14:29 CDT

## 2021-10-27 LAB
QT INTERVAL: 430 MS
QTC INTERVAL: 407 MS

## 2021-11-29 ENCOUNTER — TELEPHONE (OUTPATIENT)
Dept: CARDIOLOGY | Facility: CLINIC | Age: 61
End: 2021-11-29

## 2021-11-29 NOTE — TELEPHONE ENCOUNTER
Contacted PT per Dr. Srivastava about repatha , pharmacy is creating a new PA and willl start soon as I receive.   PT voiced understanding.      ----- Message from Kaykay Vizcarra sent at 11/29/2021  1:19 PM CST -----  Regarding: RE: tono pt  Have you called the pt to let him know this or do you want me too?   ----- Message -----  From: Lotus Bolanos MA  Sent: 11/29/2021  12:44 PM CST  To: Kaykay Vizcarra  Subject: RE: tono pt                                     I am working on, have to talk to the repatha ladies pa isnt going through.  ----- Message -----  From: Ramona Grace RegSched Rep  Sent: 11/29/2021  12:08 PM CST  To: Lotus Bolanos MA  Subject: tono pt                                         Pt has been trying to get his Rapatha approved, and said that he has been waiting on this since last week.       Pt is out and is needing this as soon as can    He left a message last week about it and wanted to check on status of this.     Can reach pt at [phone]

## 2022-01-21 ENCOUNTER — OFFICE VISIT (OUTPATIENT)
Dept: FAMILY MEDICINE CLINIC | Facility: CLINIC | Age: 62
End: 2022-01-21

## 2022-01-21 VITALS
SYSTOLIC BLOOD PRESSURE: 132 MMHG | HEIGHT: 75 IN | DIASTOLIC BLOOD PRESSURE: 80 MMHG | BODY MASS INDEX: 27.38 KG/M2 | WEIGHT: 220.2 LBS

## 2022-01-21 DIAGNOSIS — F41.9 ANXIETY: Chronic | ICD-10-CM

## 2022-01-21 DIAGNOSIS — Z12.5 SCREENING PSA (PROSTATE SPECIFIC ANTIGEN): ICD-10-CM

## 2022-01-21 DIAGNOSIS — I10 ESSENTIAL HYPERTENSION: Chronic | ICD-10-CM

## 2022-01-21 DIAGNOSIS — R35.1 NOCTURIA: ICD-10-CM

## 2022-01-21 DIAGNOSIS — R35.1 BENIGN PROSTATIC HYPERPLASIA WITH NOCTURIA: ICD-10-CM

## 2022-01-21 DIAGNOSIS — E78.2 MIXED HYPERLIPIDEMIA: Chronic | ICD-10-CM

## 2022-01-21 DIAGNOSIS — I25.10 CORONARY ARTERY DISEASE INVOLVING NATIVE CORONARY ARTERY OF NATIVE HEART WITHOUT ANGINA PECTORIS: Primary | Chronic | ICD-10-CM

## 2022-01-21 DIAGNOSIS — N40.0 BENIGN PROSTATIC HYPERPLASIA WITHOUT LOWER URINARY TRACT SYMPTOMS: ICD-10-CM

## 2022-01-21 DIAGNOSIS — N52.9 VASCULOGENIC ERECTILE DYSFUNCTION, UNSPECIFIED VASCULOGENIC ERECTILE DYSFUNCTION TYPE: ICD-10-CM

## 2022-01-21 DIAGNOSIS — N40.1 BENIGN PROSTATIC HYPERPLASIA WITH NOCTURIA: ICD-10-CM

## 2022-01-21 PROCEDURE — 99214 OFFICE O/P EST MOD 30 MIN: CPT | Performed by: FAMILY MEDICINE

## 2022-01-21 RX ORDER — TAMSULOSIN HYDROCHLORIDE 0.4 MG/1
CAPSULE ORAL
Qty: 180 CAPSULE | Refills: 1 | Status: SHIPPED | OUTPATIENT
Start: 2022-01-21 | End: 2023-01-31 | Stop reason: SDUPTHER

## 2022-01-21 RX ORDER — CLONAZEPAM 0.5 MG/1
0.5 TABLET ORAL NIGHTLY PRN
Qty: 30 TABLET | Refills: 3 | Status: SHIPPED | OUTPATIENT
Start: 2022-01-21 | End: 2023-01-31 | Stop reason: SDUPTHER

## 2022-01-21 RX ORDER — SILDENAFIL 100 MG/1
100 TABLET, FILM COATED ORAL AS NEEDED
Qty: 10 TABLET | Refills: 11 | Status: SHIPPED | OUTPATIENT
Start: 2022-01-21 | End: 2023-01-31 | Stop reason: SDUPTHER

## 2022-01-21 NOTE — PROGRESS NOTES
Answers for HPI/ROS submitted by the patient on 1/19/2022  What is the primary reason for your visit?: Other  Please describe your symptoms.: 6 month check up - minor issues (my left shoulder pain) likely some arthritis, would like a shot, as helped couple yrs ago on similar issue.  Have you had these symptoms before?: Yes  How long have you been having these symptoms?: Greater than 2 weeks  Please list any medications you are currently taking for this condition.: repatha - flowmax - clonazepam - Viagra  Please describe any probable cause for these symptoms. : Old body working to stay fit,,,/    Subjective   Shayla Kebede is a 61 y.o. male.  Reevaluation distant coronary disease hyperlipidemia history of sleep disturbance mild BPH symptoms diagnoses below in the interim continues on medicines as outlined only cardiac medicines include Repatha and aspirin.  Watching diet exercising has gained a little weight but trying to lose.  Last lab work acceptable.  Due for PSA in March.  Is up-to-date on all other.    History of Present Illness   HPI    The following portions of the patient's history were reviewed and updated as appropriate: allergies, current medications, past family history, past medical history, past social history, past surgical history and problem list.    Review of Systems  Review of Systems   Constitutional: Negative for activity change, appetite change, fatigue and unexpected weight change.   HENT: Negative for trouble swallowing and voice change.    Eyes: Negative for redness and visual disturbance.   Respiratory: Negative for cough and wheezing.    Cardiovascular: Negative for chest pain and palpitations.   Gastrointestinal: Negative for abdominal pain, constipation, diarrhea, nausea and vomiting.   Genitourinary: Negative for urgency.   Musculoskeletal: Positive for arthralgias (Standard). Negative for joint swelling.   Neurological: Negative for syncope and headaches.   Hematological:  "Negative for adenopathy.   Psychiatric/Behavioral: Negative for sleep disturbance.       Objective   Physical Exam  Physical Exam  Constitutional:       Appearance: He is well-developed.   HENT:      Head: Normocephalic.   Eyes:      Pupils: Pupils are equal, round, and reactive to light.   Neck:      Thyroid: No thyromegaly.   Cardiovascular:      Rate and Rhythm: Normal rate and regular rhythm.      Heart sounds: Normal heart sounds. No murmur heard.  No friction rub. No gallop.    Pulmonary:      Breath sounds: Normal breath sounds.   Abdominal:      General: There is no distension.      Palpations: Abdomen is soft. There is no mass.      Tenderness: There is no abdominal tenderness.   Musculoskeletal:         General: Normal range of motion.      Cervical back: Normal range of motion.      Comments: No peripheral edema 2+ pulses get up and go 3 to 5 seconds gait normal   Skin:     General: Skin is warm and dry.   Neurological:      Mental Status: He is alert and oriented to person, place, and time.      Deep Tendon Reflexes: Reflexes are normal and symmetric.   Psychiatric:         Attention and Perception: Attention normal.         Mood and Affect: Mood normal.         Speech: Speech normal.         Behavior: Behavior normal.         Thought Content: Thought content normal.         Cognition and Memory: Cognition normal.           Visit Vitals  /80   Ht 190.5 cm (75\")   Wt 99.9 kg (220 lb 3.2 oz)   BMI 27.52 kg/m²     Body mass index is 27.52 kg/m².  /80   Ht 190.5 cm (75\")   Wt 99.9 kg (220 lb 3.2 oz)   BMI 27.52 kg/m²       Assessment/Plan   Diagnoses and all orders for this visit:    1. Coronary artery disease involving native coronary artery of native heart without angina pectoris (Primary)    2. Mixed hyperlipidemia    3. Essential hypertension    4. Benign prostatic hyperplasia without lower urinary tract symptoms    5. Nocturia  -     tamsulosin (FLOMAX) 0.4 MG capsule 24 hr capsule; 1-2 " qhs for prostate everyday  Dispense: 180 capsule; Refill: 1    6. Benign prostatic hyperplasia with nocturia  -     tamsulosin (FLOMAX) 0.4 MG capsule 24 hr capsule; 1-2 qhs for prostate everyday  Dispense: 180 capsule; Refill: 1    7. Anxiety  -     clonazePAM (KlonoPIN) 0.5 MG tablet; Take 1 tablet by mouth At Night As Needed (sleep).  Dispense: 30 tablet; Refill: 3    8. Vasculogenic erectile dysfunction, unspecified vasculogenic erectile dysfunction type  -     sildenafil (Viagra) 100 MG tablet; Take 1 tablet by mouth As Needed for Erectile Dysfunction. 0.5 to 1 as needed 1 hour before intercoarse  Dispense: 10 tablet; Refill: 11    9. Screening PSA (prostate specific antigen)  -     PSA Screen; Future    Counseled mainly on lifestyle measures fall prevention hydration infection prevention.  Continue medicines as outlined refill same orders as above recheck blood pressure 6-month

## 2022-02-14 RX ORDER — EVOLOCUMAB 140 MG/ML
INJECTION, SOLUTION SUBCUTANEOUS
Qty: 2 ML | Refills: 3 | Status: SHIPPED | OUTPATIENT
Start: 2022-02-14 | End: 2022-06-16

## 2022-02-25 PROCEDURE — 87635 SARS-COV-2 COVID-19 AMP PRB: CPT | Performed by: FAMILY MEDICINE

## 2022-03-25 ENCOUNTER — LAB (OUTPATIENT)
Dept: LAB | Facility: HOSPITAL | Age: 62
End: 2022-03-25

## 2022-03-25 DIAGNOSIS — Z12.5 SCREENING PSA (PROSTATE SPECIFIC ANTIGEN): ICD-10-CM

## 2022-03-25 PROCEDURE — G0103 PSA SCREENING: HCPCS

## 2022-03-25 PROCEDURE — 36415 COLL VENOUS BLD VENIPUNCTURE: CPT

## 2022-03-26 LAB — PSA SERPL-MCNC: 1.22 NG/ML (ref 0–4)

## 2022-06-16 RX ORDER — EVOLOCUMAB 140 MG/ML
INJECTION, SOLUTION SUBCUTANEOUS
Qty: 2 ML | Refills: 3 | Status: SHIPPED | OUTPATIENT
Start: 2022-06-16 | End: 2022-10-27 | Stop reason: SDUPTHER

## 2022-06-23 ENCOUNTER — TELEPHONE (OUTPATIENT)
Dept: CARDIOLOGY | Facility: CLINIC | Age: 62
End: 2022-06-23

## 2022-06-23 ENCOUNTER — LAB (OUTPATIENT)
Dept: LAB | Facility: HOSPITAL | Age: 62
End: 2022-06-23

## 2022-06-23 DIAGNOSIS — E78.2 MIXED HYPERLIPIDEMIA: Primary | ICD-10-CM

## 2022-06-23 PROCEDURE — 80053 COMPREHEN METABOLIC PANEL: CPT | Performed by: INTERNAL MEDICINE

## 2022-06-23 PROCEDURE — 80061 LIPID PANEL: CPT | Performed by: INTERNAL MEDICINE

## 2022-06-23 PROCEDURE — 36415 COLL VENOUS BLD VENIPUNCTURE: CPT | Performed by: INTERNAL MEDICINE

## 2022-06-23 NOTE — TELEPHONE ENCOUNTER
----- Message from Kendall Epley, MA sent at 6/23/2022 12:42 PM CDT -----  Regarding: FW: Atif Patient  Contact: [phone]    ----- Message -----  From: Sarah Srivastava MD  Sent: 6/23/2022  12:32 PM CDT  To: Kendall Epley, MA  Subject: RE: Atif Patient                                I have placed the orders  ----- Message -----  From: Epley, Kendall, MA  Sent: 6/23/2022  10:58 AM CDT  To: Sarah Srivastava MD  Subject: FW: Atif Patient                                  ----- Message -----  From: Stacy David RegSched Rep  Sent: 6/23/2022  10:55 AM CDT  To: [facility] [place]  Subject: Atif Patient                                    Shayla Kebede would like to get his blood check since he his 3 weeks out from using his Repatha. Return number is [phone]. Please advise. Thanks.

## 2022-06-23 NOTE — TELEPHONE ENCOUNTER
Called and told him his orders was in Per Dr Srivastava and it a sample of Repatha in the refrigerator he will be coming to  today or tomorrow ----- Message from Kendall Epley, MA sent at 6/23/2022 12:42 PM CDT -----  Regarding: FW: Atif Patient  Contact: [phone]    ----- Message -----  From: Sarah Srivastava MD  Sent: 6/23/2022  12:32 PM CDT  To: Kendall Epley, MA  Subject: RE: Atif Patient                                I have placed the orders  ----- Message -----  From: Epley, Kendall, MA  Sent: 6/23/2022  10:58 AM CDT  To: Sarah Srivastava MD  Subject: FW: Atif Patient                                  ----- Message -----  From: Stacy David RegSched Rep  Sent: 6/23/2022  10:55 AM CDT  To: [facility]  Subject: Atif Patient                                    Shayla Kebede would like to get his blood check since he his 3 weeks out from using his Repatha. Return number is [phone]. Please advise. Thanks.

## 2022-06-24 ENCOUNTER — TELEPHONE (OUTPATIENT)
Dept: CARDIOLOGY | Facility: CLINIC | Age: 62
End: 2022-06-24

## 2022-06-24 LAB
ALBUMIN SERPL-MCNC: 4.6 G/DL (ref 3.5–5.2)
ALBUMIN/GLOB SERPL: 1.8 G/DL
ALP SERPL-CCNC: 49 U/L (ref 39–117)
ALT SERPL W P-5'-P-CCNC: 18 U/L (ref 1–41)
ANION GAP SERPL CALCULATED.3IONS-SCNC: 10.2 MMOL/L (ref 5–15)
AST SERPL-CCNC: 26 U/L (ref 1–40)
BILIRUB SERPL-MCNC: 0.5 MG/DL (ref 0–1.2)
BUN SERPL-MCNC: 16 MG/DL (ref 8–23)
BUN/CREAT SERPL: 13.3 (ref 7–25)
CALCIUM SPEC-SCNC: 9.6 MG/DL (ref 8.6–10.5)
CHLORIDE SERPL-SCNC: 101 MMOL/L (ref 98–107)
CHOLEST SERPL-MCNC: 147 MG/DL (ref 0–200)
CO2 SERPL-SCNC: 27.8 MMOL/L (ref 22–29)
CREAT SERPL-MCNC: 1.2 MG/DL (ref 0.76–1.27)
EGFRCR SERPLBLD CKD-EPI 2021: 68.4 ML/MIN/1.73
GLOBULIN UR ELPH-MCNC: 2.5 GM/DL
GLUCOSE SERPL-MCNC: 100 MG/DL (ref 65–99)
HDLC SERPL-MCNC: 59 MG/DL (ref 40–60)
LDLC SERPL CALC-MCNC: 68 MG/DL (ref 0–100)
LDLC/HDLC SERPL: 1.1 {RATIO}
POTASSIUM SERPL-SCNC: 4.2 MMOL/L (ref 3.5–5.2)
PROT SERPL-MCNC: 7.1 G/DL (ref 6–8.5)
SODIUM SERPL-SCNC: 139 MMOL/L (ref 136–145)
TRIGL SERPL-MCNC: 115 MG/DL (ref 0–150)
VLDLC SERPL-MCNC: 20 MG/DL (ref 5–40)

## 2022-07-28 ENCOUNTER — OFFICE VISIT (OUTPATIENT)
Dept: FAMILY MEDICINE CLINIC | Facility: CLINIC | Age: 62
End: 2022-07-28

## 2022-07-28 VITALS
DIASTOLIC BLOOD PRESSURE: 78 MMHG | SYSTOLIC BLOOD PRESSURE: 126 MMHG | BODY MASS INDEX: 25.95 KG/M2 | WEIGHT: 208.7 LBS | HEIGHT: 75 IN

## 2022-07-28 DIAGNOSIS — I10 ESSENTIAL HYPERTENSION: Primary | Chronic | ICD-10-CM

## 2022-07-28 DIAGNOSIS — Z86.010 HISTORY OF COLON POLYPS: ICD-10-CM

## 2022-07-28 DIAGNOSIS — I25.10 CORONARY ARTERY DISEASE INVOLVING NATIVE CORONARY ARTERY OF NATIVE HEART WITHOUT ANGINA PECTORIS: Chronic | ICD-10-CM

## 2022-07-28 DIAGNOSIS — F41.9 ANXIETY: Chronic | ICD-10-CM

## 2022-07-28 DIAGNOSIS — N40.0 BENIGN PROSTATIC HYPERPLASIA WITHOUT LOWER URINARY TRACT SYMPTOMS: Chronic | ICD-10-CM

## 2022-07-28 DIAGNOSIS — E78.2 MIXED HYPERLIPIDEMIA: Chronic | ICD-10-CM

## 2022-07-28 PROCEDURE — 99214 OFFICE O/P EST MOD 30 MIN: CPT | Performed by: FAMILY MEDICINE

## 2022-07-28 NOTE — PROGRESS NOTES
Answers for HPI/ROS submitted by the patient on 7/26/2022  What is the primary reason for your visit?: Other  Please describe your symptoms.: 6 month regular ck up & need to schedule 5 yr Colonoscopy w Dr. Mann  Have you had these symptoms before?: No  How long have you been having these symptoms?: 1-4 days  Please list any medications you are currently taking for this condition.: Repatha  Please describe any probable cause for these symptoms. : none    Subjective   Shayla Kebede is a 62 y.o. male.  Valuation diet-controlled hypertension hyperlipidemia distant coronary disease generalized anxiety disorder diagnoses below including BPH.  In the interim lab work was acceptable no new changes is still staying off of blood pressure medicine treating with diet and exercise has indeed lost about 8 pounds in the past several months.  Overall feels well the Repatha is working well.  Still has occasional nocturia but it seems to be more diet related.  Medicines have remained the same.  Continues taking clonazepam at night for sleep.  History noted.  History of Present Illness   HPI    The following portions of the patient's history were reviewed and updated as appropriate: allergies, current medications, past family history, past medical history, past social history, past surgical history and problem list.    Review of Systems  Review of Systems   Constitutional: Negative for activity change, appetite change, fatigue and unexpected weight change.   HENT: Negative for trouble swallowing and voice change.    Eyes: Negative for redness and visual disturbance.   Respiratory: Negative for cough and wheezing.    Cardiovascular: Negative for chest pain and palpitations.   Gastrointestinal: Negative for abdominal pain, constipation, diarrhea, nausea and vomiting.   Genitourinary: Positive for frequency (Related to diet and intake). Negative for urgency.   Musculoskeletal: Negative for joint swelling.   Neurological:  "Negative for syncope and headaches.   Hematological: Negative for adenopathy.   Psychiatric/Behavioral: Negative for sleep disturbance.       Objective   Physical Exam  Physical Exam  Constitutional:       Appearance: He is well-developed.   HENT:      Head: Normocephalic.      Right Ear: External ear normal.      Nose: Nose normal.   Eyes:      Pupils: Pupils are equal, round, and reactive to light.   Neck:      Thyroid: No thyromegaly.   Cardiovascular:      Rate and Rhythm: Normal rate and regular rhythm.      Heart sounds: Normal heart sounds. No murmur heard.    No friction rub. No gallop.   Pulmonary:      Breath sounds: Normal breath sounds.   Abdominal:      General: There is no distension.      Palpations: Abdomen is soft. There is no mass.      Tenderness: There is no abdominal tenderness.   Musculoskeletal:         General: Normal range of motion.      Cervical back: Normal range of motion.   Skin:     General: Skin is warm and dry.   Neurological:      Mental Status: He is alert and oriented to person, place, and time.      Deep Tendon Reflexes: Reflexes are normal and symmetric.   Psychiatric:         Mood and Affect: Mood normal.         Behavior: Behavior normal.         Thought Content: Thought content normal.         Judgment: Judgment normal.           Visit Vitals  /78   Ht 190.5 cm (75\")   Wt 94.7 kg (208 lb 11.2 oz)   BMI 26.09 kg/m²     Body mass index is 26.09 kg/m².    /78   Ht 190.5 cm (75\")   Wt 94.7 kg (208 lb 11.2 oz)   BMI 26.09 kg/m²     Assessment/Plan   Diagnoses and all orders for this visit:    1. Essential hypertension (Primary)    2. Mixed hyperlipidemia    3. Coronary artery disease involving native coronary artery of native heart without angina pectoris    4. Benign prostatic hyperplasia without lower urinary tract symptoms    5. Anxiety    6. History of colon polyps  -     Ambulatory Referral to Gastroenterology    Counseled on lifestyle measures, getting " shingles vaccine on a weekend where he can rest.  Counseled getting fourth COVID booster flu vaccine in the fall was due for colonoscopy the next year or so for colon polyps.  Recheck 6 months

## 2022-09-02 NOTE — TELEPHONE ENCOUNTER
History     Chief Complaint:  Vaginal Bleeding     The history is provided by the patient.      Susan Jones is a 20 year old female [id number] who presents with several episodes of pink-tinged/bloody tinged vaginal discharge.  Her last menstrual period .  She notes she is about 7 weeks pregnant.  She has an OB appointment on .  She notes a mild cramping is off and on since yesterday.  She denies any clots or heavy bleeding.  She has not gone through a pad event today.  She has had 2 prior vaginal deliveries without any issue.  No prior history of pregnancy related problems including diabetes or high blood pressure.  She is not taking any prenatal supplements currently..    ROS:  Review of Systems  Please see HPI. All other systems reviewed and negative.    Allergies:  No Known Drug Allergies     Medications:    The patient is not currently taking any prescribed medications.    Past Medical History:    None    Past Surgical History:    None    Family History:    None    Social History:   reports that she has never smoked. She has never used smokeless tobacco. She reports that she does not drink alcohol and does not use drugs.  Here with  and 10 month old baby  No PCP    Physical Exam   Patient Vitals for the past 24 hrs:   BP Temp Temp src Pulse Resp SpO2   22 1100 116/62 98.3  F (36.8  C) Temporal 78 18 99 %        Physical Exam  Constitutional:       Appearance: She is well-developed.   Cardiovascular:      Rate and Rhythm: Normal rate and regular rhythm.      Heart sounds: Normal heart sounds. No murmur heard.    No friction rub. No gallop.   Pulmonary:      Effort: Pulmonary effort is normal. No respiratory distress.      Breath sounds: Normal breath sounds. No wheezing or rales.   Abdominal:      General: Bowel sounds are normal. There is no distension.      Palpations: Abdomen is soft. There is no mass.      Tenderness: There is no abdominal tenderness.   Genitourinary:      PLEASE CALL THIS PATIENT FOR ME.   Comments: No vaginal bleeding. Cervix closed. Thin white discharge. No tenderness  Skin:     General: Skin is warm and dry.      Capillary Refill: Capillary refill takes less than 2 seconds.      Findings: No rash.   Neurological:      Mental Status: She is alert.       Emergency Department Course     Imaging:  OB  US 1st trimester w transvag   Preliminary Result   IMPRESSION:    1. There is a single living intrauterine embryo with ultrasound   gestational age of 7 weeks and 1 day corresponding to ultrasound   estimated date of delivery of 4/17/2023. It is too early to accurately   determine placental site.   2. Small subchorionic hemorrhage.         Report per radiology    Laboratory:  Labs Ordered and Resulted from Time of ED Arrival to Time of ED Departure   HCG QUANTITATIVE PREGNANCY - Abnormal       Result Value    hCG Quantitative 93,816 (*)    WET PREPARATION - Abnormal    Trichomonas Absent      Yeast Absent      Clue Cells Absent      WBCs/high power field 1+ (*)    CBC WITH PLATELETS AND DIFFERENTIAL    WBC Count 5.1      RBC Count 4.17      Hemoglobin 12.0      Hematocrit 36.3      MCV 87      MCH 28.8      MCHC 33.1      RDW 12.8      Platelet Count 317      % Neutrophils 40      % Lymphocytes 50      % Monocytes 9      % Eosinophils 1      % Basophils 0      % Immature Granulocytes 0      NRBCs per 100 WBC 0      Absolute Neutrophils 2.0      Absolute Lymphocytes 2.5      Absolute Monocytes 0.5      Absolute Eosinophils 0.1      Absolute Basophils 0.0      Absolute Immature Granulocytes 0.0      Absolute NRBCs 0.0     ABO AND RH    ABO/RH(D) B POS      SPECIMEN EXPIRATION DATE [id number]     CHLAMYDIA TRACHOMATIS PCR   NEISSERIA GONORRHOEAE PCR        Emergency Department Course:     Reviewed:  I reviewed nursing notes, vitals and past medical history    Assessments:  1400 I obtained history and examined the patient as noted above.   1530 I rechecked the patient and explained  findings      Disposition:  The patient was discharged to home.     Impression & Plan        Medical Decision Making:  Patient presents today for evaluation of vaginal bleeding.  Exam showed no evidence of bleeding.  Cervix is closed.  She does not have any surgical abdomen on exam.  Labs are reassuring.  Ultrasound did show she is 7 weeks.  There is also subchorionic hemorrhage.  This is most likely the source of bleeding.  She is reassured.  She is referred to follow-up with OB/GYN next week.  Return precautions provided.  Patient discharged in stable improved condition.    Diagnosis:    ICD-10-CM    1. Subchorionic hemorrhage of placenta in first trimester, single or unspecified fetus  O41.8X10     O46.8X1           9/2/2022   Tamanna Duncan MD Cheng, Wenlan, MD  09/02/22 1655

## 2022-10-18 ENCOUNTER — PREP FOR SURGERY (OUTPATIENT)
Dept: OTHER | Facility: HOSPITAL | Age: 62
End: 2022-10-18

## 2022-10-18 DIAGNOSIS — Z86.010 PERSONAL HISTORY OF COLONIC POLYPS: Primary | ICD-10-CM

## 2022-10-18 RX ORDER — DEXTROSE AND SODIUM CHLORIDE 5; .45 G/100ML; G/100ML
30 INJECTION, SOLUTION INTRAVENOUS CONTINUOUS PRN
Status: CANCELLED | OUTPATIENT
Start: 2022-11-14

## 2022-10-19 PROBLEM — Z86.010 PERSONAL HISTORY OF COLONIC POLYPS: Status: ACTIVE | Noted: 2022-10-19

## 2022-10-27 RX ORDER — EVOLOCUMAB 140 MG/ML
140 INJECTION, SOLUTION SUBCUTANEOUS
Qty: 2 ML | Refills: 3 | Status: SHIPPED | OUTPATIENT
Start: 2022-10-27 | End: 2022-11-01 | Stop reason: SDUPTHER

## 2022-10-31 ENCOUNTER — LAB (OUTPATIENT)
Dept: LAB | Facility: HOSPITAL | Age: 62
End: 2022-10-31

## 2022-10-31 ENCOUNTER — OFFICE VISIT (OUTPATIENT)
Dept: CARDIOLOGY | Facility: CLINIC | Age: 62
End: 2022-10-31

## 2022-10-31 VITALS
HEART RATE: 54 BPM | SYSTOLIC BLOOD PRESSURE: 140 MMHG | TEMPERATURE: 97.7 F | OXYGEN SATURATION: 98 % | BODY MASS INDEX: 26.47 KG/M2 | HEIGHT: 75 IN | WEIGHT: 212.9 LBS | DIASTOLIC BLOOD PRESSURE: 78 MMHG

## 2022-10-31 DIAGNOSIS — I48.0 PAROXYSMAL ATRIAL FIBRILLATION: Chronic | ICD-10-CM

## 2022-10-31 DIAGNOSIS — I10 ESSENTIAL HYPERTENSION: Chronic | ICD-10-CM

## 2022-10-31 DIAGNOSIS — I25.10 CORONARY ARTERY DISEASE INVOLVING NATIVE CORONARY ARTERY OF NATIVE HEART WITHOUT ANGINA PECTORIS: Primary | ICD-10-CM

## 2022-10-31 DIAGNOSIS — E78.2 MIXED HYPERLIPIDEMIA: Chronic | ICD-10-CM

## 2022-10-31 LAB — HBA1C MFR BLD: 5.4 % (ref 4.8–5.6)

## 2022-10-31 PROCEDURE — 83036 HEMOGLOBIN GLYCOSYLATED A1C: CPT | Performed by: INTERNAL MEDICINE

## 2022-10-31 PROCEDURE — 93000 ELECTROCARDIOGRAM COMPLETE: CPT | Performed by: INTERNAL MEDICINE

## 2022-10-31 PROCEDURE — 99214 OFFICE O/P EST MOD 30 MIN: CPT | Performed by: INTERNAL MEDICINE

## 2022-10-31 RX ORDER — POLYETHYLENE GLYCOL 3350 17 G/17G
POWDER, FOR SOLUTION ORAL
COMMUNITY
Start: 2022-09-28 | End: 2022-11-15

## 2022-10-31 NOTE — PROGRESS NOTES
Shayla Kebede  62 y.o. male    10/31/2022  1. Coronary artery disease involving native coronary artery of native heart without angina pectoris    2. Essential hypertension    3. Mixed hyperlipidemia    4. Paroxysmal atrial fibrillation (HCC)        History of Present Illness  Shayla Kebede is a 62-year-old male who is being seen by me after very long interval.  His history is remarkable for coronary artery disease with history of PCI to LAD and LCx in 2015, history of ventricular ectopy status post ablation at [facility], paroxysmal atrial fibrillation/flutter, hyperlipidemia.  He has followed up with Dr. Srivastava in the past.  He has done very well from a symptom standpoint and denies any chest pain, shortness of breath or palpitation.  He is able to perform his activities of daily living without any restrictions.  He has been compliant with his medications including Repatha which is optimized his LDL.    He indicated that his fasting blood glucose has been mildly elevated and was concerned about it.    EKG today showed sinus rhythm with heart rate of 54 bpm.  QTc interval 402 ms.  No PACs or PVCs noted.  Rightward axis.    SUBJECTIVE    No Known Allergies      Past Medical History:   Diagnosis Date   • Acquired equinus deformity of foot    • Anxiety    • Anxiety state    • Coronary arteriosclerosis    • Foreign body in conjunctival sac     OD   • Ganglion cyst     Ganglion/synovial cyst - hand      • Hyperlipidemia    • Insomnia    • Knee pain    • Neck pain     right upper extremity radiculopathy   • Pain     plantar heel pain   • Pain in right arm    • Plantar fasciitis    • Presbyopia          Past Surgical History:   Procedure Laterality Date   • CARDIAC ABLATION  2020   • CARDIAC CATHETERIZATION  03/04/2015    Cardiac cath 36764 (2)    Critical lesion in the circumflex coronary artery, proximal/mid, and successful PCI w/balloon angioplasty.Noncritical disease in the RCA and LAD.Normal LV systolic  "function with Ef of 55% to 60%.    • COLONOSCOPY N/A 5/18/2017    Procedure: COLONOSCOPY;  Surgeon: Curly Mann DO;  Location: [facility] ENDOSCOPY;  Service:    • INGUINAL HERNIA REPAIR  2020   • INJECTION OF MEDICATION  10/10/2014    Dexamethasone (1)      • INJECTION OF MEDICATION  10/10/2014    Kenalog (1)    • KNEE ACL RECONSTRUCTION     • KNEE ARTHROSCOPY      Knee arthroscopy, surgery (1)      • OTHER SURGICAL HISTORY  10/10/2014    Inj(s) Tend-Sheath, Ligament, Single 51464 (1)     • TOTAL KNEE ARTHROPLASTY      Total knee replacement (1)    done in the late to mid 90's          Family History   Problem Relation Age of Onset   • Aneurysm Mother    • Heart attack Father          Social History     Socioeconomic History   • Marital status:    Tobacco Use   • Smoking status: Former   • Smokeless tobacco: Never   Vaping Use   • Vaping Use: Never used   Substance and Sexual Activity   • Alcohol use: Yes     Comment: social   • Drug use: No   • Sexual activity: Defer         Current Outpatient Medications   Medication Sig Dispense Refill   • aspirin 81 MG EC tablet Take 1 tablet by mouth Daily. 30 tablet 3   • clonazePAM (KlonoPIN) 0.5 MG tablet Take 1 tablet by mouth At Night As Needed (sleep). 30 tablet 3   • Evolocumab (Repatha SureClick) solution auto-injector SureClick injection Inject 1 mL under the skin into the appropriate area as directed Every 14 (Fourteen) Days. 2 mL 3   • polyethylene glycol (MIRALAX) 17 GM/SCOOP powder      • sildenafil (Viagra) 100 MG tablet Take 1 tablet by mouth As Needed for Erectile Dysfunction. 0.5 to 1 as needed 1 hour before intercoarse 10 tablet 11   • tamsulosin (FLOMAX) 0.4 MG capsule 24 hr capsule 1-2 qhs for prostate everyday 180 capsule 1     No current facility-administered medications for this visit.         OBJECTIVE    /78 (BP Location: Left arm, Patient Position: Sitting, Cuff Size: Adult)   Pulse 54   Temp 97.7 °F (36.5 °C)   Ht 190.5 cm (75\")   " Wt 96.6 kg (212 lb 14.4 oz)   SpO2 98%   BMI 26.61 kg/m²         Review of Systems     Constitutional:  Denies recent weight loss, weight gain, fever or chills     HENT:  Denies any hearing loss, epistaxis, hoarseness, or difficulty speaking.     Eyes: Wears eyeglasses or contact lenses     Respiratory:  Denies dyspnea with exertion, no cough, wheezing, or hemoptysis.     Cardiovascular: Negative for palpitations, chest pain, orthopnea, PND, peripheral edema, syncope, or claudication.     Gastrointestinal:  Denies change in bowel habits, dyspepsia, ulcer disease, hematochezia, or melena.     Endocrine: Negative for cold intolerance, heat intolerance, polydipsia, polyphagia and polyuria. Denies any history of weight change, heat/cold intolerance, polydipsia, polyuria     Genitourinary: Negative.      Musculoskeletal: Denies any history of arthritic symptoms or back problems     Skin:  Denies any change in hair or nails, rashes, or skin lesions.     Allergic/Immunologic: Negative.  Negative for environmental allergies, food allergies and/or immunocompromised state.     Neurological:  Denies any history of recurrent headaches, strokes, TIA, or seizure disorder.     Hematological: Denies any food allergies, seasonal allergies, bleeding disorders, or lymphadenopathy.     Psychiatric/Behavioral: Denies any history of depression, substance abuse, or change in cognitive function.         Physical Exam     Constitutional: Cooperative, alert and oriented, well-developed, well-nourished, in no acute distress.     HENT:   Head: Normocephalic, normal hair patterns, no masses or tenderness.  Ears, Nose, and Throat: No gross abnormalities. No pallor or cyanosis. Dentition good.   Eyes: EOMS intact, PERRL, conjunctivae and lids unremarkable. Fundoscopic exam and visual fields not performed.   Neck: No palpable masses or adenopathy, no thyromegaly, no JVD, carotid pulses are full and equal bilaterally and without bruit.      Cardiovascular: Regular rhythm, S1 and S2 normal, no S3 or S4.  No murmurs, gallops, or rubs detected.     Pulmonary/Chest: Chest: normal symmetry, normal respiratory excursion, no intercostal retraction, no use of accessory muscles.     Pulmonary: Normal breath sounds. No rales or rhonchi.    Abdominal: Abdomen soft, bowel sounds normoactive, no masses, no hepatosplenomegaly, nontender, no bruit.     Musculoskeletal: No deformities, clubbing, cyanosis, erythema, or edema observed. There are no spinal abnormalities noted. Normal muscle strength and tone. Pulses full and equal in all extremities, no bruit auscultated.     Neurological: No gross motor or sensory deficits noted, affect appropriate, oriented to time, person, place.     Skin: Warm and dry to the touch, no apparent skin lesion or mass noted.     Psychiatric: He has a normal mood and affect. His behavior is normal. Judgment and thought content normal.         Procedures      Lab Results   Component Value Date    WBC 6.64 05/22/2020    HGB 15.6 05/22/2020    HCT 44.7 05/22/2020    MCV 89.2 05/22/2020     05/22/2020     Lab Results   Component Value Date    GLUCOSE 100 (H) 06/23/2022    BUN 16 06/23/2022    CREATININE 1.20 06/23/2022    EGFRIFNONA 78 04/22/2021    BCR 13.3 06/23/2022    CO2 27.8 06/23/2022    CALCIUM 9.6 06/23/2022    ALBUMIN 4.60 06/23/2022    AST 26 06/23/2022    ALT 18 06/23/2022     Lab Results   Component Value Date    CHOL 147 06/23/2022    CHOL 137 08/30/2021    CHOL 239 (H) 04/22/2021     Lab Results   Component Value Date    TRIG 115 06/23/2022    TRIG 115 08/30/2021    TRIG 89 04/22/2021     Lab Results   Component Value Date    HDL 59 06/23/2022    HDL 60 08/30/2021    HDL 56 04/22/2021     No components found for: LDLCALC  Lab Results   Component Value Date    LDL 68 06/23/2022    LDL 57 08/30/2021     (H) 04/22/2021     No results found for: HDLLDLRATIO  No components found for: CHOLHDL  No results found for:  HGBA1C  Lab Results   Component Value Date    TSH 2.51 07/10/2015           ASSESSMENT AND PLAN  Mr. Kebede is a 62-year-old male with multiple medical issues as discussed on the history of present illness with history of coronary artery disease, ventricular ectopy status post ablation, paroxysmal atrial fibrillation, hyperlipidemia who is progressing well with no cardiac symptoms at the present time.  His LDL was 68 and HDL 59 in June 2022.    I have continued his medication including antiplatelet therapy with aspirin and lipid-lowering therapy with Repatha.  He will be reassessed in a year or sooner if the clinical situation changes.  Hemoglobin A1c is being checked to make sure that he does not have diabetes mellitus.    Diagnoses and all orders for this visit:    1. Coronary artery disease involving native coronary artery of native heart without angina pectoris (Primary)  -     ECG 12 Lead  -     Hemoglobin A1c    2. Essential hypertension  -     Hemoglobin A1c    3. Mixed hyperlipidemia  -     Hemoglobin A1c    4. Paroxysmal atrial fibrillation (HCC)  -     Hemoglobin A1c        BMI is >= 25 and <30. (Overweight) The following options were offered after discussion;: exercise counseling/recommendations      Shayla Kebede  reports that he has quit smoking. He has never used smokeless tobacco.           Job Naranjo MD  10/31/2022  08:34 CDT

## 2022-11-01 ENCOUNTER — TELEPHONE (OUTPATIENT)
Dept: CARDIOLOGY | Facility: CLINIC | Age: 62
End: 2022-11-01

## 2022-11-01 LAB
QT INTERVAL: 424 MS
QTC INTERVAL: 402 MS

## 2022-11-01 RX ORDER — EVOLOCUMAB 140 MG/ML
140 INJECTION, SOLUTION SUBCUTANEOUS
Qty: 6 ML | Refills: 3 | Status: SHIPPED | OUTPATIENT
Start: 2022-11-01

## 2022-11-01 NOTE — TELEPHONE ENCOUNTER
Refill was sent to [facility] (Repatha)----- Message from Theresa Arreola sent at 10/27/2022  8:07 AM CDT -----  Contact: [phone]  He has an appt Monday to establish with Dr Dc (former tono Pt)  Can his Repatha be called into [facility] so he doesn't get off schedule.  He left a message on the v/m yesterday @ 4:58

## 2022-11-09 DIAGNOSIS — M25.512 ACUTE PAIN OF LEFT SHOULDER: Primary | ICD-10-CM

## 2022-11-14 ENCOUNTER — HOSPITAL ENCOUNTER (OUTPATIENT)
Facility: HOSPITAL | Age: 62
Setting detail: HOSPITAL OUTPATIENT SURGERY
Discharge: HOME | End: 2022-11-14
Attending: INTERNAL MEDICINE | Admitting: INTERNAL MEDICINE

## 2022-11-14 ENCOUNTER — ANESTHESIA (OUTPATIENT)
Dept: GASTROENTEROLOGY | Facility: HOSPITAL | Age: 62
End: 2022-11-14

## 2022-11-14 ENCOUNTER — ANESTHESIA EVENT (OUTPATIENT)
Dept: GASTROENTEROLOGY | Facility: HOSPITAL | Age: 62
End: 2022-11-14

## 2022-11-14 VITALS
DIASTOLIC BLOOD PRESSURE: 63 MMHG | WEIGHT: 206 LBS | RESPIRATION RATE: 16 BRPM | OXYGEN SATURATION: 100 % | TEMPERATURE: 97.1 F | HEIGHT: 75 IN | HEART RATE: 60 BPM | SYSTOLIC BLOOD PRESSURE: 114 MMHG | BODY MASS INDEX: 25.61 KG/M2

## 2022-11-14 DIAGNOSIS — Z86.010 PERSONAL HISTORY OF COLONIC POLYPS: ICD-10-CM

## 2022-11-14 PROCEDURE — 25010000002 PROPOFOL 10 MG/ML EMULSION: Performed by: NURSE ANESTHETIST, CERTIFIED REGISTERED

## 2022-11-14 RX ORDER — PROPOFOL 10 MG/ML
VIAL (ML) INTRAVENOUS AS NEEDED
Status: DISCONTINUED | OUTPATIENT
Start: 2022-11-14 | End: 2022-11-14 | Stop reason: SURG

## 2022-11-14 RX ORDER — ONDANSETRON 2 MG/ML
4 INJECTION INTRAMUSCULAR; INTRAVENOUS ONCE AS NEEDED
Status: DISCONTINUED | OUTPATIENT
Start: 2022-11-14 | End: 2022-11-14 | Stop reason: HOSPADM

## 2022-11-14 RX ORDER — DEXTROSE AND SODIUM CHLORIDE 5; .45 G/100ML; G/100ML
30 INJECTION, SOLUTION INTRAVENOUS CONTINUOUS PRN
Status: DISCONTINUED | OUTPATIENT
Start: 2022-11-14 | End: 2022-11-14 | Stop reason: HOSPADM

## 2022-11-14 RX ADMIN — PROPOFOL 30 MG: 10 INJECTION, EMULSION INTRAVENOUS at 14:42

## 2022-11-14 RX ADMIN — PROPOFOL 20 MG: 10 INJECTION, EMULSION INTRAVENOUS at 14:44

## 2022-11-14 RX ADMIN — DEXTROSE AND SODIUM CHLORIDE 30 ML/HR: 5; 450 INJECTION, SOLUTION INTRAVENOUS at 14:10

## 2022-11-14 RX ADMIN — PROPOFOL 20 MG: 10 INJECTION, EMULSION INTRAVENOUS at 14:47

## 2022-11-14 RX ADMIN — PROPOFOL 20 MG: 10 INJECTION, EMULSION INTRAVENOUS at 14:50

## 2022-11-14 RX ADMIN — PROPOFOL 80 MG: 10 INJECTION, EMULSION INTRAVENOUS at 14:40

## 2022-11-14 RX ADMIN — PROPOFOL 20 MG: 10 INJECTION, EMULSION INTRAVENOUS at 14:53

## 2022-11-14 NOTE — ANESTHESIA POSTPROCEDURE EVALUATION
Patient: Shayla Kebede    Procedure Summary     Date: 11/14/22 Room / Location: [facility] ENDOSCOPY 2 / [facility] ENDOSCOPY    Anesthesia Start: 1437 Anesthesia Stop: 1456    Procedure: COLONOSCOPY 2:30 Diagnosis:       Personal history of colonic polyps      Diverticulosis of large intestine without perforation or abscess      (Personal history of colonic polyps [Z86.010])    Surgeons: Curly Mann DO Provider: Keisha Romano CRNA    Anesthesia Type: general ASA Status: 3          Anesthesia Type: general    Vitals  No vitals data found for the desired time range.          Post Anesthesia Care and Evaluation    Patient location during evaluation: bedside  Patient participation: complete - patient participated  Level of consciousness: awake  Pain score: 0  Pain management: adequate    Airway patency: patent  Anesthetic complications: No anesthetic complications  PONV Status: none  Cardiovascular status: acceptable  Respiratory status: acceptable  Hydration status: acceptable    Comments: ---------------------------               11/14/22                      1457         ---------------------------   BP:          158/87         Pulse:         54           Resp:          20           Temp:   96.4 °F (35.8 °C)   SpO2:          96%         ---------------------------

## 2022-11-14 NOTE — ANESTHESIA PREPROCEDURE EVALUATION
Anesthesia Evaluation     Patient summary reviewed and Nursing notes reviewed   no history of anesthetic complications:  NPO Solid Status: > 8 hours  NPO Liquid Status: > 2 hours           Airway   Mallampati: II  TM distance: >3 FB  Neck ROM: full  No difficulty expected  Dental - normal exam     Pulmonary - normal exam   (+) a smoker Former,   Cardiovascular - normal exam    Rhythm: regular  Rate: normal    (+) CAD, cardiac stents (x2 2015) more than 12 months ago dysrhythmias (hx of ablation) Atrial Fib,   (-) angina      Neuro/Psych  (+) numbness, psychiatric history Anxiety,    GI/Hepatic/Renal/Endo - negative ROS   (-) diabetes    Musculoskeletal     (+) neck pain,   Abdominal    Substance History - negative use     OB/GYN          Other   arthritis,                    Anesthesia Plan    ASA 3     general   total IV anesthesia  intravenous induction     Anesthetic plan, risks, benefits, and alternatives have been provided, discussed and informed consent has been obtained with: patient.  Pre-procedure education provided      CODE STATUS:

## 2022-11-14 NOTE — H&P
Skip Escalante DO,[facility]  Gastroenterology  Hepatology  Endoscopy  Board Certified in Internal Medicine and gastroenterology  [address], [address]  [place]. [address]  T- [phone]   F - [phone]     GASTROENTEROLOGY HISTORY AND PHYSICAL  NOTE   SKIP ESCALANTE DO.         SUBJECTIVE:   11/14/2022    Name: Shayla Kebede  DOD: 1960      Chief Complaint:     Subjective : Personal history of colon polyps    Patient is 62 y.o. male presents with desire for elective colonoscopy.      ROS/HISTORY/ CURRENT MEDICATIONS/OBJECTIVE/VS/PE:   Review of Systems:  All systems unremarkable unless specified below.  Constitutional   HENT  Eyes   Respiratory    Cardiovascular  Gastrointestinal   Endocrine  Genitourinary    Musculoskeletal   Skin  Allergic/Immunologic    Neurological    Hematological  Psychiatric/Behavioral    History:     Past Medical History:   Diagnosis Date   • Acquired equinus deformity of foot    • Anxiety    • Anxiety state    • Coronary arteriosclerosis    • Foreign body in conjunctival sac     OD   • Ganglion cyst     Ganglion/synovial cyst - hand      • Hyperlipidemia    • Insomnia    • Knee pain    • Neck pain     right upper extremity radiculopathy   • Pain     plantar heel pain   • Pain in right arm    • Plantar fasciitis    • Presbyopia      Past Surgical History:   Procedure Laterality Date   • CARDIAC ABLATION  2020   • CARDIAC CATHETERIZATION  03/04/2015    Cardiac cath 95846 (2)    Critical lesion in the circumflex coronary artery, proximal/mid, and successful PCI w/balloon angioplasty.Noncritical disease in the RCA and LAD.Normal LV systolic function with Ef of 55% to 60%.    • COLONOSCOPY N/A 05/18/2017    Procedure: COLONOSCOPY;  Surgeon: Skip Escalante DO;  Location: [facility] ENDOSCOPY;  Service:    • INGUINAL HERNIA REPAIR  2020   • INJECTION OF MEDICATION  10/10/2014    Dexamethasone (1)      • INJECTION OF MEDICATION  10/10/2014    Kenalog (1)    • KNEE  ACL RECONSTRUCTION     • KNEE ARTHROSCOPY      Knee arthroscopy, surgery (1)      • OTHER SURGICAL HISTORY  10/10/2014    Inj(s) Tend-Sheath, Ligament, Single 46472 (1)     • TOTAL KNEE ARTHROPLASTY      Total knee replacement (1)    done in the late to mid 90's      Family History   Problem Relation Age of Onset   • Aneurysm Mother    • Heart attack Father      Social History     Tobacco Use   • Smoking status: Former   • Smokeless tobacco: Never   Vaping Use   • Vaping Use: Never used   Substance Use Topics   • Alcohol use: Yes     Comment: social   • Drug use: No     Prior to Admission medications    Medication Sig Start Date End Date Taking? Authorizing Provider   polyethylene glycol (MIRALAX) 17 GM/SCOOP powder  9/28/22  Yes ProviderDwain MD   tamsulosin (FLOMAX) 0.4 MG capsule 24 hr capsule 1-2 qhs for prostate everyday 1/21/22  Yes Piyush Bernstein MD   aspirin 81 MG EC tablet Take 1 tablet by mouth Daily. 7/23/19   Amber Dee MD   clonazePAM (KlonoPIN) 0.5 MG tablet Take 1 tablet by mouth At Night As Needed (sleep). 1/21/22   Piyush Bernstein MD   Evolocumab (Repatha SureClick) solution auto-injector SureClick injection Inject 1 mL under the skin into the appropriate area as directed Every 14 (Fourteen) Days. 11/1/22   Job Naranjo MD   sildenafil (Viagra) 100 MG tablet Take 1 tablet by mouth As Needed for Erectile Dysfunction. 0.5 to 1 as needed 1 hour before intercoarse 1/21/22   Piyush Bernstein MD     Allergies:  Patient has no known allergies.    I have reviewed the patients medical history, surgical history and family history in the available medical record system.     Current Medications:     Current Facility-Administered Medications   Medication Dose Route Frequency Provider Last Rate Last Admin   • dextrose 5 % and sodium chloride 0.45 % infusion  30 mL/hr Intravenous Continuous PRN Curly Mann DO 30 mL/hr at 11/14/22 1410 30 mL/hr at 11/14/22 1410       Objective      Physical Exam:   Temp:  [96.4 °F (35.8 °C)] 96.4 °F (35.8 °C)  Heart Rate:  [60] 60  Resp:  [20] 20  BP: (158)/(87) 158/87    Physical Exam:  General Appearance:    Alert, cooperative, in no acute distress   Head:    Normocephalic, without obvious abnormality, atraumatic   Eyes:            Lids and lashes normal, conjunctivae and sclerae normal, no icterus, no pallor, corneas clear, PERRLA   Ears:    Ears appear intact with no abnormalities noted   Throat:   No oral lesions, no thrush, oral mucosa moist   Neck:   No adenopathy, supple, trachea midline, no thyromegaly, no  carotid bruit, no JVD   Back:     No kyphosis present, no scoliosis present, no skin lesions,   erythema or scars, no tenderness to percussion or                 palpation,  range of motion normal   Lungs:     Clear to auscultation,respirations regular, even and         unlabored    Heart:    Regular rhythm and normal rate, normal S1 and S2, no  murmur, no gallop, no rub, no click   Breast Exam:    Deferred   Abdomen:     Normal bowel sounds, no masses, no organomegaly, soft  nontender, nondistended, no guarding, no rebound                 tenderness   Genitalia:    Deferred   Extremities:   Moves all extremities well, no edema, no cyanosis, no          redness   Pulses:   Pulses palpable and equal bilaterally   Skin:   No bleeding, bruising or rash   Lymph nodes:   No palpable adenopathy   Neurologic:   Cranial nerves 2 - 12 grossly intact, sensation intact, DTR     present and equal bilaterally      Results Review:     Lab Results   Component Value Date    WBC 6.64 05/22/2020    WBC 5.17 01/04/2019    WBC 7.5 07/10/2015    HGB 15.6 05/22/2020    HGB 15.8 01/04/2019    HGB 15.1 07/10/2015    HCT 44.7 05/22/2020    HCT 45.9 01/04/2019    HCT 45.1 07/10/2015     05/22/2020     01/04/2019     07/10/2015             No results found for: LIPASE  Lab Results   Component Value Date    INR 1.0 03/04/2015     No results found for:  RESPCX    Radiology Review:  Imaging Results (Last 72 Hours)     ** No results found for the last 72 hours. **           I reviewed the patient's new clinical results.  I reviewed the patient's new imaging results and agree with the interpretation.     ASSESSMENT/PLAN:   ASSESSMENT:  1.  Personal history of colon polyps    PLAN:  1.  Colonoscopy    Risk and benefits associated with the procedure are reviewed with the patient.  The patient wished to proceed     Curly Mann DO  11/14/22  14:33 CST

## 2022-11-15 ENCOUNTER — OFFICE VISIT (OUTPATIENT)
Dept: ORTHOPEDIC SURGERY | Facility: CLINIC | Age: 62
End: 2022-11-15

## 2022-11-15 VITALS — BODY MASS INDEX: 25.75 KG/M2 | HEIGHT: 75 IN

## 2022-11-15 DIAGNOSIS — M25.512 ACUTE PAIN OF LEFT SHOULDER: ICD-10-CM

## 2022-11-15 DIAGNOSIS — M19.012 OSTEOARTHRITIS OF LEFT AC (ACROMIOCLAVICULAR) JOINT: Primary | ICD-10-CM

## 2022-11-15 DIAGNOSIS — M19.012 PRIMARY OSTEOARTHRITIS OF LEFT SHOULDER: ICD-10-CM

## 2022-11-15 PROCEDURE — 96372 THER/PROPH/DIAG INJ SC/IM: CPT | Performed by: NURSE PRACTITIONER

## 2022-11-15 PROCEDURE — 20610 DRAIN/INJ JOINT/BURSA W/O US: CPT | Performed by: NURSE PRACTITIONER

## 2022-11-15 PROCEDURE — 99203 OFFICE O/P NEW LOW 30 MIN: CPT | Performed by: NURSE PRACTITIONER

## 2022-11-15 RX ORDER — BUPIVACAINE HYDROCHLORIDE 5 MG/ML
2 INJECTION, SOLUTION PERINEURAL
Status: COMPLETED | OUTPATIENT
Start: 2022-11-15 | End: 2022-11-15

## 2022-11-15 RX ORDER — KETOROLAC TROMETHAMINE 30 MG/ML
60 INJECTION, SOLUTION INTRAMUSCULAR; INTRAVENOUS ONCE
Status: COMPLETED | OUTPATIENT
Start: 2022-11-15 | End: 2022-11-15

## 2022-11-15 RX ORDER — TRIAMCINOLONE ACETONIDE 40 MG/ML
40 INJECTION, SUSPENSION INTRA-ARTICULAR; INTRAMUSCULAR
Status: COMPLETED | OUTPATIENT
Start: 2022-11-15 | End: 2022-11-15

## 2022-11-15 RX ORDER — MELOXICAM 7.5 MG/1
7.5 TABLET ORAL DAILY
Qty: 60 TABLET | Refills: 0 | Status: SHIPPED | OUTPATIENT
Start: 2022-11-15 | End: 2023-01-14

## 2022-11-15 RX ADMIN — BUPIVACAINE HYDROCHLORIDE 2 ML: 5 INJECTION, SOLUTION PERINEURAL at 10:09

## 2022-11-15 RX ADMIN — KETOROLAC TROMETHAMINE 60 MG: 30 INJECTION, SOLUTION INTRAMUSCULAR; INTRAVENOUS at 10:14

## 2022-11-15 RX ADMIN — TRIAMCINOLONE ACETONIDE 40 MG: 40 INJECTION, SUSPENSION INTRA-ARTICULAR; INTRAMUSCULAR at 10:09

## 2022-11-15 NOTE — PROGRESS NOTES
Shayla Kebede is a 62 y.o. male   Primary provider:  Piyush Bernstein MD       Chief Complaint   Patient presents with   • Left Shoulder - Initial Evaluation, Pain       HISTORY OF PRESENT ILLNESS: This 62-year-old male patient presents today with complaints of left shoulder pain.  The patient reports his pain started approximately 2 to 3 months ago.  The patient denies injury.  The patient reports his pain is at the top of his shoulder and worse with lifting overhead.  The patient describes his pain as moderate to severe, intermittent stabbing, aching pain.  The patient has tried a intra-articular injection however this was to the right shoulder not the left shoulder.  He has also tried some intermittent over-the-counter NSAIDs, rest and modified activity with minimal relief.  Patient reports his pain is consistent and worsening.  Patient is interested in a intra-articular shoulder injection today as he has had 1 in the past on the right shoulder which did improve his pain.  Denies numbness and tingling.  Denies fever and chills.  Sent for x-ray upon arrival.    Arm Pain   Incident onset: a couple months  There was no injury mechanism. The pain is present in the left shoulder. The quality of the pain is described as shooting. The pain does not radiate. The pain is severe. The pain has been intermittent since the incident. The symptoms are aggravated by movement and lifting. He has tried rest for the symptoms.        CONCURRENT MEDICAL HISTORY:    Past Medical History:   Diagnosis Date   • Acquired equinus deformity of foot    • Anxiety    • Anxiety state    • Coronary arteriosclerosis    • Foreign body in conjunctival sac     OD   • Ganglion cyst     Ganglion/synovial cyst - hand      • Hyperlipidemia    • Insomnia    • Knee pain    • Neck pain     right upper extremity radiculopathy   • Pain     plantar heel pain   • Pain in right arm    • Plantar fasciitis    • Presbyopia        No Known  Allergies      Current Outpatient Medications:   •  aspirin 81 MG EC tablet, Take 1 tablet by mouth Daily., Disp: 30 tablet, Rfl: 3  •  clonazePAM (KlonoPIN) 0.5 MG tablet, Take 1 tablet by mouth At Night As Needed (sleep)., Disp: 30 tablet, Rfl: 3  •  Evolocumab (Repatha SureClick) solution auto-injector SureClick injection, Inject 1 mL under the skin into the appropriate area as directed Every 14 (Fourteen) Days., Disp: 6 mL, Rfl: 3  •  sildenafil (Viagra) 100 MG tablet, Take 1 tablet by mouth As Needed for Erectile Dysfunction. 0.5 to 1 as needed 1 hour before intercoarse, Disp: 10 tablet, Rfl: 11  •  tamsulosin (FLOMAX) 0.4 MG capsule 24 hr capsule, 1-2 qhs for prostate everyday, Disp: 180 capsule, Rfl: 1  •  meloxicam (Mobic) 7.5 MG tablet, Take 1 tablet by mouth Daily for 60 days., Disp: 60 tablet, Rfl: 0  No current facility-administered medications for this visit.    Past Surgical History:   Procedure Laterality Date   • CARDIAC ABLATION  2020   • CARDIAC CATHETERIZATION  03/04/2015    Cardiac cath 70228 (2)    Critical lesion in the circumflex coronary artery, proximal/mid, and successful PCI w/balloon angioplasty.Noncritical disease in the RCA and LAD.Normal LV systolic function with Ef of 55% to 60%.    • COLONOSCOPY N/A 05/18/2017    Procedure: COLONOSCOPY;  Surgeon: Curly Mann DO;  Location: [facility] ENDOSCOPY;  Service:    • INGUINAL HERNIA REPAIR  2020   • INJECTION OF MEDICATION  10/10/2014    Dexamethasone (1)      • INJECTION OF MEDICATION  10/10/2014    Kenalog (1)    • KNEE ACL RECONSTRUCTION     • KNEE ARTHROSCOPY      Knee arthroscopy, surgery (1)      • OTHER SURGICAL HISTORY  10/10/2014    Inj(s) Tend-Sheath, Ligament, Single 19615 (1)     • TOTAL KNEE ARTHROPLASTY      Total knee replacement (1)    done in the late to mid 90's        Family History   Problem Relation Age of Onset   • Aneurysm Mother    • Heart attack Father         Social History     Socioeconomic History   • Marital  "status:    Tobacco Use   • Smoking status: Former   • Smokeless tobacco: Never   Vaping Use   • Vaping Use: Never used   Substance and Sexual Activity   • Alcohol use: Yes     Comment: social   • Drug use: No   • Sexual activity: Defer        Review of Systems    PHYSICAL EXAMINATION:       Ht 190.5 cm (75\")   BMI 25.75 kg/m²     Physical Exam    GAIT:     []  Normal  []  Antalgic    Assistive device: []  None  []  Walker     []  Crutches  []  Cane     []  Wheelchair  []  Stretcher    Left Shoulder Exam     Tenderness   The patient is experiencing tenderness in the acromioclavicular joint and acromion (Diffuse).    Tests   Cross arm: positive  Impingement: positive    Other   Erythema: absent  Scars: absent  Sensation: normal  Pulse: present     Comments:  Overall good range of motion but pain with arc of motion is noted.  Positive empty can test.  Positive full can test.                        ASSESSMENT:    Diagnoses and all orders for this visit:    Osteoarthritis of left AC (acromioclavicular) joint  -     meloxicam (Mobic) 7.5 MG tablet; Take 1 tablet by mouth Daily for 60 days.  -     Large Joint Arthrocentesis: L subacromial bursa  -     ketorolac (TORADOL) injection 60 mg    Primary osteoarthritis of left shoulder  -     meloxicam (Mobic) 7.5 MG tablet; Take 1 tablet by mouth Daily for 60 days.  -     Large Joint Arthrocentesis: L subacromial bursa  -     ketorolac (TORADOL) injection 60 mg    Acute pain of left shoulder  -     meloxicam (Mobic) 7.5 MG tablet; Take 1 tablet by mouth Daily for 60 days.  -     Large Joint Arthrocentesis: L subacromial bursa  -     ketorolac (TORADOL) injection 60 mg          PLAN    Large Joint Arthrocentesis: L subacromial bursa  Date/Time: 11/15/2022 10:09 AM  Consent given by: patient  Site marked: site marked  Timeout: Immediately prior to procedure a time out was called to verify the correct patient, procedure, equipment, support staff and site/side marked as " required   Supporting Documentation  Indications: pain   Procedure Details  Location: shoulder - L subacromial bursa  Preparation: Patient was prepped and draped in the usual sterile fashion  Needle size: 22 G  Approach: posterior  Medications administered: 40 mg triamcinolone acetonide 40 MG/ML; 2 mL bupivacaine 0.5 %  Patient tolerance: patient tolerated the procedure well with no immediate complications      Sent for x-ray upon arrival.  Reviewed x-ray and discussed with patient there are arthritic changes noted to the AC joint.  There is narrowing with osteophytes noted.  No acute bony abnormalities noted.  Recommended patient continue to strengthen condition.  Demonstrated some stretching exercises to perform.  Recommended a intra-articular corticosteroid injection to the left shoulder.  Reviewed risk and benefits.  Patient verbalized understanding and wished to proceed with the injection.  Patient tolerated the corticosteroid left shoulder intra-articular steroid injection well with no immediate reaction.  Recommended a prescription strength of NSAIDs such as meloxicam.  Reviewed risk and benefits.  Recommend starting a prescription oral NSAID for improved control of pain/inflammation. Meloxicam is prescribed today. Patient is instructed to take the medication daily. Patient is instructed to take the medication with food to minimize any potential GI upset. Patient is instructed not to take any additional NSAIDs, such as Ibuprofen or Aleve, while taking Meloxicam. Patient can also take Tylenol as needed for additional pain control.   Reviewed patient's most recent labs.  GFR is within normal limits, A1c is 5.4.  Reviewed risk and benefits of an IM Toradol injection.  Patient verbalized understanding wished to proceed with the injection.  Patient tolerated the Toradol 60 mg IM injection well with no immediate reaction.  Declined PT at this visit.  We discussed if his symptoms do not improve, he may call our  office and I will order physical therapy.  Recommended patient follow-up as needed if symptoms change or worsen.    All questions and concerns are addressed with understanding noted. They are aware and are in agreement to this plan.    Return if symptoms worsen or fail to improve, for Recheck.    MARIA FERNANDA Apodaca    This document has been electronically signed by MARIA FERNANDA Apodaca on November 15, 2022 10:47 CST      EMR Dragon/Transciption Disclaimer: Some of this note may be an electronic transcription/translation of spoken language to printed text using the Dragon Dictation System.     Time spent of a minimum of 30 minutes including the face to face evaluation, reviewing of medical history and prior medial records, reviewing of diagnostic studies, documentation, patient education and coordination of care.     Body mass index is 25.75 kg/m².

## 2023-01-31 ENCOUNTER — OFFICE VISIT (OUTPATIENT)
Dept: FAMILY MEDICINE CLINIC | Facility: CLINIC | Age: 63
End: 2023-01-31
Payer: COMMERCIAL

## 2023-01-31 VITALS
BODY MASS INDEX: 27.02 KG/M2 | HEIGHT: 75 IN | SYSTOLIC BLOOD PRESSURE: 116 MMHG | DIASTOLIC BLOOD PRESSURE: 76 MMHG | WEIGHT: 217.3 LBS

## 2023-01-31 DIAGNOSIS — E78.2 MIXED HYPERLIPIDEMIA: Chronic | ICD-10-CM

## 2023-01-31 DIAGNOSIS — N52.9 VASCULOGENIC ERECTILE DYSFUNCTION, UNSPECIFIED VASCULOGENIC ERECTILE DYSFUNCTION TYPE: ICD-10-CM

## 2023-01-31 DIAGNOSIS — I10 ESSENTIAL HYPERTENSION: Primary | Chronic | ICD-10-CM

## 2023-01-31 DIAGNOSIS — I25.10 CORONARY ARTERY DISEASE INVOLVING NATIVE CORONARY ARTERY OF NATIVE HEART WITHOUT ANGINA PECTORIS: Chronic | ICD-10-CM

## 2023-01-31 DIAGNOSIS — R35.1 BENIGN PROSTATIC HYPERPLASIA WITH NOCTURIA: ICD-10-CM

## 2023-01-31 DIAGNOSIS — N40.1 BENIGN PROSTATIC HYPERPLASIA WITH NOCTURIA: ICD-10-CM

## 2023-01-31 DIAGNOSIS — F41.9 ANXIETY: Chronic | ICD-10-CM

## 2023-01-31 DIAGNOSIS — R35.1 NOCTURIA: ICD-10-CM

## 2023-01-31 DIAGNOSIS — N40.0 BENIGN PROSTATIC HYPERPLASIA WITHOUT LOWER URINARY TRACT SYMPTOMS: Chronic | ICD-10-CM

## 2023-01-31 PROBLEM — Z86.010 PERSONAL HISTORY OF COLONIC POLYPS: Chronic | Status: ACTIVE | Noted: 2022-10-19

## 2023-01-31 PROCEDURE — 99214 OFFICE O/P EST MOD 30 MIN: CPT | Performed by: FAMILY MEDICINE

## 2023-01-31 RX ORDER — TAMSULOSIN HYDROCHLORIDE 0.4 MG/1
CAPSULE ORAL
Qty: 180 CAPSULE | Refills: 3 | Status: SHIPPED | OUTPATIENT
Start: 2023-01-31

## 2023-01-31 RX ORDER — SILDENAFIL 100 MG/1
100 TABLET, FILM COATED ORAL AS NEEDED
Qty: 10 TABLET | Refills: 11 | Status: SHIPPED | OUTPATIENT
Start: 2023-01-31

## 2023-01-31 RX ORDER — CLONAZEPAM 0.5 MG/1
0.5 TABLET ORAL NIGHTLY PRN
Qty: 30 TABLET | Refills: 3 | Status: SHIPPED | OUTPATIENT
Start: 2023-01-31

## 2023-01-31 NOTE — PROGRESS NOTES
Subjective   Shayla Kebede is a 62 y.o. male.  Haueisen distant coronary disease hypertension hyperlipidemia mild generalized anxiety disorder diagnoses below in the interim maintain weight and blood pressure fairly well.  Last lab work acceptable.  Has had all vaccines except shingles vaccine with counseled getting same on a weekend work and rest arm.  Continues to exercise routinely still has occasional arthralgias treated conservatively.  Has had a colonoscopy.  Chart reviewed.  History of Present Illness   HPI    The following portions of the patient's history were reviewed and updated as appropriate: allergies, current medications, past family history, past medical history, past social history, past surgical history and problem list.    Review of Systems  Review of Systems   Constitutional: Negative for activity change, appetite change, fatigue and unexpected weight change.   HENT: Negative for trouble swallowing and voice change.    Eyes: Negative for redness and visual disturbance.   Respiratory: Negative for cough and wheezing.    Cardiovascular: Negative for chest pain and palpitations.   Gastrointestinal: Negative for abdominal pain, constipation, diarrhea, nausea and vomiting.   Genitourinary: Negative for urgency.   Musculoskeletal: Positive for arthralgias and neck pain. Negative for joint swelling.   Neurological: Negative for syncope and headaches.   Hematological: Negative for adenopathy.   Psychiatric/Behavioral: Negative for sleep disturbance.       Objective   Physical Exam  Physical Exam  Constitutional:       Appearance: He is well-developed.   HENT:      Head: Normocephalic.   Eyes:      Pupils: Pupils are equal, round, and reactive to light.   Neck:      Thyroid: No thyromegaly.   Cardiovascular:      Rate and Rhythm: Normal rate and regular rhythm.      Heart sounds: Normal heart sounds. No murmur heard.    No friction rub. No gallop.   Pulmonary:      Breath sounds: Normal breath  "sounds.   Abdominal:      General: There is no distension.      Palpations: Abdomen is soft. There is no mass.      Tenderness: There is no abdominal tenderness.   Musculoskeletal:         General: Normal range of motion.      Cervical back: Normal range of motion.      Comments: 2+ pulses get up and go 3 to 5 seconds gait normal   Skin:     General: Skin is warm and dry.   Neurological:      Mental Status: He is alert and oriented to person, place, and time.      Deep Tendon Reflexes: Reflexes are normal and symmetric.   Psychiatric:         Attention and Perception: Attention normal.         Mood and Affect: Mood normal.         Speech: Speech normal.         Behavior: Behavior normal.         Thought Content: Thought content normal.         Cognition and Memory: Cognition normal.           Visit Vitals  /76   Ht 190.5 cm (75\")   Wt 98.6 kg (217 lb 4.8 oz)   BMI 27.16 kg/m²     Body mass index is 27.16 kg/m².  Results for orders placed or performed in visit on 10/31/22   Hemoglobin A1c    Specimen: Blood   Result Value Ref Range    Hemoglobin A1C 5.40 4.80 - 5.60 %   ECG 12 Lead   Result Value Ref Range    QT Interval 424 ms    QTC Interval 402 ms         Assessment/Plan   Diagnoses and all orders for this visit:    1. Essential hypertension (Primary)  -     Magnesium; Future  -     Comprehensive Metabolic Panel; Future    2. Mixed hyperlipidemia  -     Lipid Panel With LDL / HDL Ratio; Future    3. Benign prostatic hyperplasia without lower urinary tract symptoms  -     PSA DIAGNOSTIC; Future    4. Anxiety  -     clonazePAM (KlonoPIN) 0.5 MG tablet; Take 1 tablet by mouth At Night As Needed (sleep).  Dispense: 30 tablet; Refill: 3    5. Coronary artery disease involving native coronary artery of native heart without angina pectoris  -     Lipid Panel With LDL / HDL Ratio; Future  -     Comprehensive Metabolic Panel; Future  -     CBC (No Diff); Future    6. Nocturia  -     tamsulosin (FLOMAX) 0.4 MG capsule " 24 hr capsule; 1-2 qhs for prostate everyday  Dispense: 180 capsule; Refill: 3    7. Benign prostatic hyperplasia with nocturia  -     tamsulosin (FLOMAX) 0.4 MG capsule 24 hr capsule; 1-2 qhs for prostate everyday  Dispense: 180 capsule; Refill: 3    8. Vasculogenic erectile dysfunction, unspecified vasculogenic erectile dysfunction type  -     sildenafil (Viagra) 100 MG tablet; Take 1 tablet by mouth As Needed for Erectile Dysfunction. 0.5 to 1 as needed 1 hour before intercoarse  Dispense: 10 tablet; Refill: 11    Counseled mainly on lifestyle measures diet exercise salt restriction weight control etc.  Counseled on fall prevention.  Recheck 6 months all lab prior

## 2023-03-16 NOTE — PROGRESS NOTES
Subjective   Shayla Kebede is a 58 y.o. male.    cc: Back pain  History of Present Illness The patient comes in for evaluation of low back pain. He has this occur from time to time. It is worse with movement. It has flared up recently and is going down his left leg.This pain comes and goes ,but it has been more persistent over the last couple of weeks. He is taking Tizanadine and it has not been helping. He has been doing YOGA and that has eased it up slightly.    The following portions of the patient's history were reviewed and updated as appropriate: allergies, current medications, past family history, past medical history, past social history, past surgical history and problem list.    Review of Systems   Constitutional: Negative for fatigue and fever.   Respiratory: Negative for cough, chest tightness and stridor.    Cardiovascular: Negative for chest pain, palpitations and leg swelling.   Musculoskeletal: Positive for back pain and myalgias. Negative for gait problem.       Objective   Physical Exam   Constitutional: He appears well-developed and well-nourished.   HENT:   Head: Normocephalic and atraumatic.   Right Ear: External ear normal.   Left Ear: External ear normal.   Nose: Nose normal.   Mouth/Throat: Oropharynx is clear and moist.   Eyes: Pupils are equal, round, and reactive to light.   Cardiovascular: Normal rate, regular rhythm and normal heart sounds. Exam reveals no friction rub.   No murmur heard.  Pulmonary/Chest: Effort normal and breath sounds normal. No stridor. No respiratory distress.   Musculoskeletal:   There is tenderness over the lower lumbar area. The Patellar reflexes are hyper. Straight leg raising on the left doesn't cause pain in the back.   Vitals reviewed.        Assessment/Plan   Shayla was seen today for back pain.    Diagnoses and all orders for this visit:    Chronic midline low back pain with left-sided sciatica  -     XR Spine Lumbar 4+ View; Future    Sciatica of  present left side    Other orders  -     methocarbamol (ROBAXIN) 500 MG tablet; Take 1 tablet by mouth 4 (Four) Times a Day.        He is to get an Xray and will replace Tizanidine with Robaxin.  WIll try to get an MRI if the plain films are negative. Will get him back to Dr. Bernstein.

## 2023-07-24 ENCOUNTER — TELEPHONE (OUTPATIENT)
Dept: CARDIOLOGY | Facility: CLINIC | Age: 63
End: 2023-07-24
Payer: COMMERCIAL

## 2023-07-24 RX ORDER — EVOLOCUMAB 140 MG/ML
140 INJECTION, SOLUTION SUBCUTANEOUS
Qty: 2 ML | Refills: 3 | Status: SHIPPED | OUTPATIENT
Start: 2023-07-24

## 2023-07-27 ENCOUNTER — LAB (OUTPATIENT)
Dept: LAB | Facility: HOSPITAL | Age: 63
End: 2023-07-27
Payer: COMMERCIAL

## 2023-07-27 DIAGNOSIS — I10 ESSENTIAL HYPERTENSION: Chronic | ICD-10-CM

## 2023-07-27 DIAGNOSIS — I25.10 CORONARY ARTERY DISEASE INVOLVING NATIVE CORONARY ARTERY OF NATIVE HEART WITHOUT ANGINA PECTORIS: Chronic | ICD-10-CM

## 2023-07-27 DIAGNOSIS — E78.2 MIXED HYPERLIPIDEMIA: Chronic | ICD-10-CM

## 2023-07-27 DIAGNOSIS — N40.0 BENIGN PROSTATIC HYPERPLASIA WITHOUT LOWER URINARY TRACT SYMPTOMS: Chronic | ICD-10-CM

## 2023-07-27 LAB
ALBUMIN SERPL-MCNC: 4.4 G/DL (ref 3.5–5.2)
ALBUMIN/GLOB SERPL: 1.6 G/DL
ALP SERPL-CCNC: 51 U/L (ref 39–117)
ALT SERPL W P-5'-P-CCNC: 15 U/L (ref 1–41)
ANION GAP SERPL CALCULATED.3IONS-SCNC: 10.1 MMOL/L (ref 5–15)
AST SERPL-CCNC: 23 U/L (ref 1–40)
BILIRUB SERPL-MCNC: 0.6 MG/DL (ref 0–1.2)
BUN SERPL-MCNC: 15 MG/DL (ref 8–23)
BUN/CREAT SERPL: 14.2 (ref 7–25)
CALCIUM SPEC-SCNC: 9.8 MG/DL (ref 8.6–10.5)
CHLORIDE SERPL-SCNC: 103 MMOL/L (ref 98–107)
CHOLEST SERPL-MCNC: 168 MG/DL (ref 0–200)
CO2 SERPL-SCNC: 25.9 MMOL/L (ref 22–29)
CREAT SERPL-MCNC: 1.06 MG/DL (ref 0.76–1.27)
DEPRECATED RDW RBC AUTO: 43.9 FL (ref 37–54)
EGFRCR SERPLBLD CKD-EPI 2021: 78.4 ML/MIN/1.73
ERYTHROCYTE [DISTWIDTH] IN BLOOD BY AUTOMATED COUNT: 13.8 % (ref 12.3–15.4)
GLOBULIN UR ELPH-MCNC: 2.7 GM/DL
GLUCOSE SERPL-MCNC: 95 MG/DL (ref 65–99)
HCT VFR BLD AUTO: 45 % (ref 37.5–51)
HDLC SERPL-MCNC: 53 MG/DL (ref 40–60)
HGB BLD-MCNC: 15.3 G/DL (ref 13–17.7)
LDLC SERPL CALC-MCNC: 93 MG/DL (ref 0–100)
LDLC/HDLC SERPL: 1.7 {RATIO}
MAGNESIUM SERPL-MCNC: 2.4 MG/DL (ref 1.6–2.4)
MCH RBC QN AUTO: 30.2 PG (ref 26.6–33)
MCHC RBC AUTO-ENTMCNC: 34 G/DL (ref 31.5–35.7)
MCV RBC AUTO: 88.9 FL (ref 79–97)
PLATELET # BLD AUTO: 276 10*3/MM3 (ref 140–450)
PMV BLD AUTO: 10.9 FL (ref 6–12)
POTASSIUM SERPL-SCNC: 4.3 MMOL/L (ref 3.5–5.2)
PROT SERPL-MCNC: 7.1 G/DL (ref 6–8.5)
PSA SERPL-MCNC: 1.29 NG/ML (ref 0–4)
RBC # BLD AUTO: 5.06 10*6/MM3 (ref 4.14–5.8)
SODIUM SERPL-SCNC: 139 MMOL/L (ref 136–145)
TRIGL SERPL-MCNC: 125 MG/DL (ref 0–150)
VLDLC SERPL-MCNC: 22 MG/DL (ref 5–40)
WBC NRBC COR # BLD: 5.74 10*3/MM3 (ref 3.4–10.8)

## 2023-07-27 PROCEDURE — 85027 COMPLETE CBC AUTOMATED: CPT

## 2023-07-27 PROCEDURE — 80061 LIPID PANEL: CPT

## 2023-07-27 PROCEDURE — 36415 COLL VENOUS BLD VENIPUNCTURE: CPT

## 2023-07-27 PROCEDURE — 84153 ASSAY OF PSA TOTAL: CPT

## 2023-07-27 PROCEDURE — 80053 COMPREHEN METABOLIC PANEL: CPT

## 2023-07-27 PROCEDURE — 83735 ASSAY OF MAGNESIUM: CPT

## 2023-08-01 ENCOUNTER — OFFICE VISIT (OUTPATIENT)
Dept: FAMILY MEDICINE CLINIC | Facility: CLINIC | Age: 63
End: 2023-08-01
Payer: COMMERCIAL

## 2023-08-01 VITALS
SYSTOLIC BLOOD PRESSURE: 132 MMHG | HEIGHT: 75 IN | DIASTOLIC BLOOD PRESSURE: 78 MMHG | WEIGHT: 215.7 LBS | OXYGEN SATURATION: 98 % | HEART RATE: 88 BPM | BODY MASS INDEX: 26.82 KG/M2

## 2023-08-01 DIAGNOSIS — I25.10 CORONARY ARTERY DISEASE INVOLVING NATIVE CORONARY ARTERY OF NATIVE HEART WITHOUT ANGINA PECTORIS: Chronic | ICD-10-CM

## 2023-08-01 DIAGNOSIS — R35.1 NOCTURIA: ICD-10-CM

## 2023-08-01 DIAGNOSIS — I10 ESSENTIAL HYPERTENSION: Primary | Chronic | ICD-10-CM

## 2023-08-01 DIAGNOSIS — E78.2 MIXED HYPERLIPIDEMIA: Chronic | ICD-10-CM

## 2023-08-01 DIAGNOSIS — R35.1 BENIGN PROSTATIC HYPERPLASIA WITH NOCTURIA: ICD-10-CM

## 2023-08-01 DIAGNOSIS — N40.1 BENIGN PROSTATIC HYPERPLASIA WITH NOCTURIA: ICD-10-CM

## 2023-08-01 PROCEDURE — 99214 OFFICE O/P EST MOD 30 MIN: CPT | Performed by: FAMILY MEDICINE

## 2023-08-01 RX ORDER — TAMSULOSIN HYDROCHLORIDE 0.4 MG/1
CAPSULE ORAL
Qty: 180 CAPSULE | Refills: 3 | Status: SHIPPED | OUTPATIENT
Start: 2023-08-01

## 2023-10-05 NOTE — PROGRESS NOTES
[facility]  NEW PATIENT EVALUATION    CHIEF COMPLAINT: back pain    -------- HISTORY OF PRESENT ILLNESS --------   Monik Townsend is a 80 year old with pmhx of HTN, CAD, PAD, HLD, SCC of R lung s/p RUL lobectomy, emphysema, IDDM2, diabetic polyneuropathy, lumbar spinal stenosis being evaluated today for evaluation and treatment of back and leg pain.    Patient presenting today with ongoing from left low back and lateral hip and radiates down to the LLE to the foot. Also some pain in the RLE from thigh down to feet. Feels legs get swollen. Feels left leg feels diffusely numb. Pain worse with walking, better with sitting. Saw Dr. Suazo (NS) 9/22/23, patient not interested in surgery.        LOCATION OF PAIN:  Left low back, b/l LEs    PATIENT DESCRIBES PAIN AS sharp, burning, aching, cramping  RADIATES:  LLE    VAS NOW 8  VAS AT WORST 8       PAIN STARTED 4/2023 after falling down stairs        NUMBNESS/TINGLING tingling in fingers  PRESENCE OF WEAKNESS LLE weak  WORSENING FACTORS: walking, reaching, coughing  ALLEVIATING FACTORS:  sitting  URINARY INCONTINENCE: none  FECAL INCONTINENCE: none   SADDLE ANESTHESIA : none   SLEEP HYGIENE:  Doesn't sleep well in general    Denies current infection, anticoagulation or allergy to Latex/steroid/contrast dye. Denies weakness, numbness or bowel/bladder incontinence. Denies Saddle Anesthesia.    Current treatment: tylenol 650mg, meloxicam 7.5mg daily, lyrica 75mg BID, tylenol    Previous Injections:   -none    Prior Pain Medications:             Oral Steroids: no   Muscle Relaxants: no  Gabapentin: yes  Lyrica: yes    Duloxetine: no    Chronic opiates: no   Amitriptyline: no   Nortriptyline: no      [place] monitoring checked: Yes       Other Treatments:             Physical therapy: yes ongoing, HEP  Chiropractor: no    TENS: no    Brace: no    Acupuncture: no   Prior Pain Clinic: no        PMH:    Past Medical History:   Diagnosis  Shayla Kebede  59 y.o. male    07/23/2019  1. Coronary artery disease involving native coronary artery of native heart without angina pectoris    2. Paroxysmal atrial fibrillation (CMS/HCC)    3. Hyperlipidemia, unspecified hyperlipidemia type    4. Essential hypertension        History of Present Illness:    Body mass index is 26.37 kg/m². BMI is above normal parameters. Recommendations include: exercise counseling, nutrition counseling and referral to primary care.      59 years old patient who injured his back and treated with Excedrin and mild hematuria stopped that and also stop the Effient for about a day or 2 with a significant improvement and continued having a back pain present  to the ER noted atrial flutter with a controlled ventricular rate and scheduled to have MRI for the back.  Patient started on Percocet with improvement in pain with past medical history significant for hyperlipidemia, remote history paroxysmal atrial fibrillation  last echocardiographic study 2008, abnormal stress test leading to cardiac catheterization with PTCA stent of LAD and left circumflex system.  Patient is on appropriate medical management and during remarkably well.  He scheduled to the blood test done that included lipid profile through is a family doctor's office.  He denies orthopnea, PND, nauseous, vomiting.  Denies any polydipsia polyuria.  Denies any fever cough or chills.  Present dysuria hematuria.  Had history of chronic neck pain.Patient      The patient unfortunately unable to take a statin every day due to significant muscle fatigue affecting his quality of life is taking atorvastatin 3-4 times per week.     8/10/18  Total Cholesterol 0 - 199 mg/dL 171    Triglycerides 20 - 199 mg/dL 84    HDL Cholesterol 60 - 200 mg/dL 48 Abnormally low     LDL Cholesterol  1 - 129 mg/dL 91    LDL/HDL Ratio 0.00 - 3.55 2.21          5/2018  Total Cholesterol 0 - 199 mg/dL 246     Triglycerides 20 - 199 mg/dL 197    HDL  Cholesterol 60 - 200 mg/dL 52     LDL Cholesterol  0 - 129 mg/dL 155     VLDL Cholesterol mg/dL 39.4    LDL/HDL Ratio 0.00 - 3.55 2.97       2017  · Left ventricular systolic function is normal. Estimated EF = 55%.  · Left ventricular diastolic dysfunction (grade I) consistent with impaired relaxation.  · Mild tricuspid valve regurgitation is present.     2008  · Left ventricular systolic function is normal. Estimated EF = 55%.  · Left ventricular diastolic dysfunction (grade I) consistent with impaired relaxation.  · Mild tricuspid valve regurgitation is present               SUBJECTIVE:    No Known Allergies      Past Medical History:   Diagnosis Date   • Acquired equinus deformity of foot    • Anxiety    • Anxiety state    • Coronary arteriosclerosis    • Foreign body in conjunctival sac     OD   • Ganglion cyst     Ganglion/synovial cyst - hand      • Hyperlipidemia    • Insomnia    • Knee pain    • Neck pain     right upper extremity radiculopathy   • Pain     plantar heel pain   • Pain in right arm    • Plantar fasciitis    • Presbyopia          Past Surgical History:   Procedure Laterality Date   • CARDIAC CATHETERIZATION  03/04/2015    Cardiac cath 34059 (2)    Critical lesion in the circumflex coronary artery, proximal/mid, and successful PCI w/balloon angioplasty.Noncritical disease in the RCA and LAD.Normal LV systolic function with Ef of 55% to 60%.    • COLONOSCOPY N/A 5/18/2017    Procedure: COLONOSCOPY;  Surgeon: Curly Mann DO;  Location: [facility] ENDOSCOPY;  Service:    • INJECTION OF MEDICATION  10/10/2014    Dexamethasone (1)      • INJECTION OF MEDICATION  10/10/2014    Kenalog (1)    • KNEE ACL RECONSTRUCTION     • KNEE ARTHROSCOPY      Knee arthroscopy, surgery (1)      • OTHER SURGICAL HISTORY  10/10/2014    Inj(s) Tend-Sheath, Ligament, Single 18203 (1)     • TOTAL KNEE ARTHROPLASTY      Total knee replacement (1)    done in the late to mid 90's          Family History   Problem Relation  Date   • Abdominal pain    • Acute blood loss anemia 02/13/2021   • Adenocarcinoma of right lung (CMD) 3/12/2021   • Anemia, iron deficiency    • Arthritis    • Cataract    • Chronic diarrhea    • Chronic edema     venous stasis edema of bilateral lower limbs   • Cigarette nicotine dependence with nicotine-induced disorder 9/4/2019   • Colon polyps    • Coronary artery disease    • Diabetes (CMD)    • Emphysema, unspecified (CMD)    • Fatigue    • Fractures     L1 and thoracic compression fractures   • Glaucoma    • Hyperlipidemia    • Hypertension    • Lumbar radiculopathy    • Lung cancer (CMD)    • Lung nodule    • Osteoporosis    • Renal lesion    • S/P thoracotomy 02/13/2021   • Umbilical hernia    • UTI (urinary tract infection)    • Varicose veins of bilateral lower extremities with pain    • Vitiligo    • Weight loss          PSH:    Past Surgical History:   Procedure Laterality Date   • Cataract extraction, bilateral  06/2021   • Hernia repair     • Lung lobectomy Right 02/11/2021    BRONCHOSCOPY RIGHT UPPER LOBECTOMY Dr. Mcclelland   • Rectal examination under anesthesia  09/11/2018    w/excision of rectal skin tags   • Umbilical hernia repair  12/12/2016     MEDICATIONS:    Outpatient Medications Marked as Taking for the 10/5/23 encounter (Hospital Encounter) with Kurt Antoine MD   Medication Sig Dispense Refill   • meloxicam (MOBIC) 7.5 MG tablet TAKE 1 TABLET BY MOUTH DAILY 30 tablet 0   • albuterol (VENTOLIN) (2.5 MG/3ML) 0.083% nebulizer solution Take 3 mLs by nebulization every 6 hours as needed for Wheezing. 75 mL 3   • pregabalin (LYRICA) 75 MG capsule Take 1 capsule by mouth in the morning and 1 capsule in the evening. Discontinue Gabapentin 180 capsule 1   • Microlet Lancets Misc Use one each twice a day as directed diagnosis E11.65 100 each 11   • Blood Glucose Monitoring Suppl (FreeStyle Lite) w/Device Kit 1 each  in the morning and 1 each in the evening. diagnosis E11.65 1 kit 3   • blood  Age of Onset   • Aneurysm Mother    • Heart attack Father          Social History     Socioeconomic History   • Marital status:      Spouse name: Not on file   • Number of children: Not on file   • Years of education: Not on file   • Highest education level: Not on file   Tobacco Use   • Smoking status: Former Smoker   • Smokeless tobacco: Never Used   Substance and Sexual Activity   • Alcohol use: Yes     Frequency: Never     Comment: social   • Drug use: No   • Sexual activity: Defer         Current Outpatient Medications   Medication Sig Dispense Refill   • prasugrel (EFFIENT) 10 MG tablet TAKE ONE TABLET BY MOUTH DAILY 90 tablet 1   • sildenafil (VIAGRA) 100 MG tablet Take 1 tablet by mouth As Needed for erectile dysfunction. 0.5 to 1 as needed 1 hour before intercoarse 10 tablet 11   • atorvastatin (LIPITOR) 20 MG tablet Take 1 tablet by mouth Every Other Day. 45 tablet 3   • clonazePAM (KlonoPIN) 0.5 MG tablet Take 1 tablet by mouth At Night As Needed (sleep). 30 tablet 3     No current facility-administered medications for this visit.            Review of Systems:     Constitutional:  Denies recent weight loss, weight gain, fever or chills, no change in exercise tolerance.     HENT:  Denies any hearing loss, epistaxis, hoarseness, or difficulty speaking.     Eyes: No blurred    Respiratory:  Denies dyspnea with exertion,no cough, wheezing, or hemoptysis.     Cardiovascular: H&P    Gastrointestinal:  Denies change in bowel habits, dyspepsia, ulcer disease, hematochezia, or melena.     Endocrine: Negative for cold intolerance, heat intolerance, polydipsia, polyphagia and polyuria. Denies any history of weight change, polydipsia, polyuria.     Genitourinary: Negative.      Musculoskeletal: Denies any history of arthritic symptoms or back problems.     Skin:  Denies any change in hair or nails, rashes, or skin lesions.     Allergic/Immunologic: Negative.  Negative for environmental allergies, food  glucose (FREESTYLE LITE) test strip Test blood sugar 2 times daily. Diagnosis: E11.65. Meter: Freestyle lite or pharmacist choice. 100 strip 11   • albuterol 108 (90 Base) MCG/ACT inhaler Inhale 2 puffs into the lungs every 4 hours as needed for Shortness of Breath or Wheezing. 1 each 12   • acetaminophen (Tylenol 8 Hour Arthritis Pain) 650 MG CR tablet Take 650 mg by mouth every 8 hours as needed for Pain.     • alendronate (FOSAMAX) 70 MG tablet Take 1 tablet by mouth every 7 days. 12 tablet 1   • insulin glargine (Lantus SoloStar) 100 UNIT/ML pen-injector INJECT 10 UNITS SUBCUTANEOUSLY AT BEDTIME TIME 15 mL 3   • losartan (COZAAR) 100 MG tablet Take 1 tablet by mouth every evening. 90 tablet 3   • metFORMIN (GLUCOPHAGE-XR) 500 MG 24 hr tablet Take 1 tablet by mouth daily (with breakfast). 90 tablet 3   • Continuous Blood Gluc  (FreeStyle Gwyn 2 [place]) Device 1 each every 14 days. 1 each 11   • Continuous Blood Gluc Sensor (FreeStyle Gwyn 2 Sensor) Misc Apply 1 each topically every 14 days. 2 each 11   • pioglitazone (ACTOS) 15 MG tablet Take 1 tablet by mouth daily. 90 tablet 3   • latanoprost (XALATAN) 0.005 % ophthalmic solution Place 1 drop into both eyes at bedtime.     • Calcium Carb-Cholecalciferol 600-400 MG-UNIT Tab Take 1 tablet by mouth daily.       ALLERGIES:  ALLERGIES:  Glimepiride, Baclofen, Glipizide, and Latex   (environmental)  SH:   Marital Status: lives with great grandson,    Children: 1, 2 grandkids, 1 great grandson   Etoh use: 0 per week   Tobacco: 0 packs per day   Recreational drugs: denies   History of Polysubstance abuse: no   Occupation: retired [employer] worker   Litigation: no    Workman's Compensation Case: no    FH:  No history of chronic pain disorders in primary relatives       Family History   Problem Relation Age of Onset   • Patient is unaware of any medical problems Mother    • Liver Disease Father    • COPD Sister    • Cancer Sister    • COPD Brother    •  "allergies and immunocompromised state.     Neurological:  Denies any history of recurrent headaches, strokes, TIA, or seizure disorder.     Hematological: Denies any food allergies, seasonal allergies, bleeding disorders, or lymphadenopathy.     Psychiatric/Behavioral: Denies any history of depression, substance abuse, or change in cognitive function.       OBJECTIVE:    /78   Pulse 56   Ht 190.5 cm (75\")   Wt 95.7 kg (211 lb)   SpO2 99%   BMI 26.37 kg/m²       Physical Exam:     Constitutional: Cooperative, alert and oriented, well-developed, well-nourished, in no acute distress.     HENT:   Head: Normocephalic, normal hair patterns, no masses or tenderness.  Ears, Nose, and Throat: No gross abnormalities. No pallor or cyanosis. Dentition good.   Eyes: EOMS intact, PERRL, conjunctivae and lids unremarkable. Fundoscopic exam and visual fields not performed.   Neck: No palpable masses or adenopathy, no thyromegaly, no JVD, carotid pulses are full and equal bilaterally and without  Bruits.     Cardiovascular: Regular rhythm, S1 and S2 normal, no S3 or S4. Apical impulse not displaced. No murmurs, gallops, or rubs detected.     Pulmonary/Chest: Chest: normal symmetry, no tenderness to palpation, normal respiratory excursion, no intercostal retraction, no use of accessory muscles. Pulmonary: Normal breath sounds. No rales or rhonchi.    Abdominal: Abdomen soft, bowel sounds normoactive, no masses, no hepatosplenomegaly, non-tender, no bruits.     Musculoskeletal: No deformities, clubbing, cyanosis, erythema, or edema observed. There are no spinal abnormalities noted. Normal muscle strength and tone. Pulses full and equal in all extremities, no bruits auscultated.     Neurological: No gross motor or sensory deficits noted, affect appropriate, oriented to time, person, place.     Skin: Warm and dry to the touch, no apparent skin lesions or masses noted.     Psychiatric: He has a normal mood and affect. His " Hypertension Niece    • Diabetes Niece    • Hypertension Other        Review of Systems:  GENERAL:No fevers  HEENT: Negative for frequent or significant headaches  NECK: Negative for swelling  RESPIRATORY: Negative for shortness of breath  CARDIOVASCULAR: Negative for chest pain  GASTROINTESTINAL: Neg for abdominal pain  GENITOURINARY: Negative for incontinence  MUSCULOSKELETAL: As above in HPI  NEUROLOGIC:Negative for syncope  SKIN: Negative for rash  ENDOCRINE: Negative for cold or heat intolerance           ----------------PHYSICAL EXAMINATION  --------------------     Visit Vitals  BP (!) 152/77 (BP Location: LUE - Left upper extremity, Patient Position: Sitting)   Pulse 80   Resp 18   Ht 5' 1\" (1.549 m)   Wt 52.2 kg (115 lb)   SpO2 98%   BMI 21.73 kg/m²          ____________________ CONSTITUTIONAL ____________________     Gen. appearance: Appears well nourished, well developed and appropriately groomed               ____________________ CARDIOVASCULAR ____________________   HEART: Regular rate and rhythm   RESPIRATORY:  Non-labored respirations  PERIPHERAL VASCULAR: Extremities are warm and dry; there is no edema, swelling, varicosities, or palpable tenderness in the upper or lower extremities               ____________________ MUSCULOSKELETAL ____________________   GAIT/STATION: slow ambulates carefully with rolling walker, avoids weight on left leg  RANGE OF MOTION: Full range of motion of all b/l LE without pain, crepitus, contracture in the wrists, hips, knees and ankles  SPINE: Kyphotic spine, Palpation/percussion at the midline lumbar level reveals NO tenderness    Range of Motion   Lumbar Presence of Pain   Flexion   limited yes   Extension   limited yes   Lat Flexion       Lat Rotation         TRIGGER POINTS: painful muscle spasm NOT identified    STRENGTH R L STRENGTH R L      Psoas (L2) 5 4+      Quad (L3) 5 4+      ATib (L4) 5 4+      EHL (L5) 5 4+      Gastr (S1) 5 4+      Hamstring (S2) 5 4+      The patient was cooperative and exhibited normal effort during motor and coordination exams    Provocative Tests Result       SLR sitting Pos L   Delbert/BRUNILDA Neg B       Thigh Thrust Neg B   SIJ tenderness with palpation Pos R                   ________________________ Skin ____________________   Skin color, temperature, & turgor are normal in all 4 extremities. No rashes/suspicious skin lesions. No edema in the upper or lower extremities.        ____________________ Neurologic ____________________   ORIENTATION: Alert & oriented to person, place,time,event   PSYCHIATRIC: MOOD/AFFECT: Normal      COORDINATION: Normal coordination and balance, negative Romberg sign      SENSATION: Intact to light touch and temperature sensation in the lumbar/sacral bilateral dermatomes      DTR R L DTR R L DTR R L      Patellar 1 1         Achilles 1 1                           -------- MEDICAL DECISION MAKING   ----------           ____________________ DATA REVIEW ____________________       I have independently reviewed and interpreted the following images and reviewed them with the patient.     MRI Lumbar Spine 8/14/23  IMPRESSION:     1.   Multilevel degenerative changes with severe spinal canal narrowing at  L4-L5.  Redundant cauda equina nerve roots adjacent to this level  suggestive of functional tethering.     2.   Severe bilateral neural foraminal narrowing at L4-L5 and L5-S1.    ____________________ Assessment ____________________     Assessment: Monik Townsend is a 80 year old with pmhx of HTN, CAD, PAD, HLD, SCC of R lung s/p RUL lobectomy, emphysema, IDDM2, diabetic polyneuropathy, lumbar spinal stenosis being evaluated today for evaluation and treatment of back and leg pain.  Patient presents primarily with left low back pain radiating down the left lower extremity.  Associated numbness, feels weakness.  Ambulates with a rolling walker feels she cannot put weight on the left leg.  She has been taking  behavior is normal. Judgment and thought content normal.         Procedures      Lab Results   Component Value Date    WBC 5.17 01/04/2019    HGB 15.8 01/04/2019    HCT 45.9 01/04/2019    MCV 89.1 01/04/2019     01/04/2019     Lab Results   Component Value Date    GLUCOSE 113 (H) 01/04/2019    BUN 14 01/04/2019    CREATININE 0.99 01/04/2019    EGFRIFNONA 77 01/04/2019    BCR 14.1 01/04/2019    CO2 30.0 01/04/2019    CALCIUM 9.3 01/04/2019    ALBUMIN 4.50 01/04/2019    AST 38 01/04/2019    ALT 26 01/04/2019     Lab Results   Component Value Date    CHOL 171 08/10/2018    CHOL 246 (H) 05/15/2018     Lab Results   Component Value Date    TRIG 84 08/10/2018    TRIG 197 05/15/2018    TRIG 150 07/10/2015     Lab Results   Component Value Date    HDL 48 (L) 08/10/2018    HDL 52 (L) 05/15/2018    HDL 53 (L) 07/10/2015     No components found for: LDLCALC  Lab Results   Component Value Date    LDL 91 08/10/2018     (H) 05/15/2018    LDL 95 10/31/2017     No results found for: HDLLDLRATIO  No components found for: CHOLHDL  No results found for: HGBA1C  Lab Results   Component Value Date    TSH 2.51 07/10/2015           ASSESSMENT AND PLAN:   CAD status post PTCA/ stent.  #2 paroxysmal atrial fibrillation/atrial flutter no further recurrence More than 8 years ago that was secondary to EtOH and no further recurrence  #3 hyperlipidemia #PVCs             58 years old patient recently evaluated in the ER noted in atrial flutter spontaneously converted to sinus rhythm confirmed by the EKG in the office today.  Patient of significant back problem and waiting for MRI.  I will discuss the possibility of EP study and flutter ablation or continue present medication as her sinus rhythm.  Pros and cons of both option discussed.  He will consider ablation after knowing the status of his of back pain and is scheduled for MRI.  He is currently being treated with Effient and had mild hematuria when he was taking Excedrin  anti-inflammatory and Lyrica for her pain.  She was evaluated by neurosurgery, patient not interested in any type of surgery.  On exam she has a positive straight leg raise on the left, slight decreased strength diffusely on the left lower extremity compared to the right.  MRI with evidence of multilevel degenerative disc disease and multilevel spinal stenosis most severe at L4-5.  We discussed treatment options and nature of her condition.  Explained we can try an epidural steroid injection, may help with some of the acute radicular symptoms but would likely not help with neurogenic claudication symptoms.  We can also explore other medications if injections do not help.  Patient understanding and agreeable to plan.      Diagnosis list:      1)      Lumbar radiculitis   2)      Lumbar spinal stenosis with neurogenic claudication        3)      Lumbar spondylosis  4)      Lumbar DDD        ____________________ Treatment Recommendations _____________     Injections recommended:  L5-S1 LESI   Studies or tests ordered today: none        Physical Therapy:  CPM   Activity Level: as tolerated         Medications prescribed: CPM         Follow up visit: next available for LESI    Education was provided to the patient regarding diagnosis, treatment options, medications, and SIDE EFFECTS thereof.  The education was completed using face to face conversation and the recipient expressed understanding verbally.     Kurt Antoine MD  Interventional Pain Management  [facility]   resolved after discontinuations.  Patient doesn't want to consider oral anticoagulation at this stage with history of CAD status post PTCA stent of LAD and left circumflex system doing remarkably well.  Last echocardiographic study in 2008 and repeat echo in 2017 reported to have good heart function.  Will arrange another echocardiographic study given the history of paroxysmal atrial fibrillation and CAD.  He will continue atorvastatin for management of hyperlipidemia with  lipid profile, aspirin and Effient with History of CAD and PTCA stent of LAD and left circumflex system.  No further recurrence of chest pain           Shayla was seen today for follow-up.    Diagnoses and all orders for this visit:    Coronary artery disease involving native coronary artery of native heart without angina pectoris    Paroxysmal atrial fibrillation (CMS/HCC)    Hyperlipidemia, unspecified hyperlipidemia type    Essential hypertension        Amber Dee MD  7/23/2019  4:12 PM

## (undated) DEVICE — CANN SMPL SOFTECH BIFLO ETCO2 A/M 7FT

## (undated) DEVICE — SINGLE-USE BIOPSY FORCEPS: Brand: RADIAL JAW 4